# Patient Record
Sex: MALE | Race: WHITE | NOT HISPANIC OR LATINO | Employment: FULL TIME | URBAN - METROPOLITAN AREA
[De-identification: names, ages, dates, MRNs, and addresses within clinical notes are randomized per-mention and may not be internally consistent; named-entity substitution may affect disease eponyms.]

---

## 2021-09-17 ENCOUNTER — OFFICE VISIT (OUTPATIENT)
Dept: INTERNAL MEDICINE | Facility: CLINIC | Age: 21
End: 2021-09-17

## 2021-09-17 ENCOUNTER — LAB (OUTPATIENT)
Dept: LAB | Facility: HOSPITAL | Age: 21
End: 2021-09-17

## 2021-09-17 VITALS
SYSTOLIC BLOOD PRESSURE: 140 MMHG | WEIGHT: 189.2 LBS | HEIGHT: 72 IN | DIASTOLIC BLOOD PRESSURE: 94 MMHG | RESPIRATION RATE: 18 BRPM | TEMPERATURE: 97.7 F | OXYGEN SATURATION: 96 % | BODY MASS INDEX: 25.63 KG/M2 | HEART RATE: 80 BPM

## 2021-09-17 DIAGNOSIS — R41.840 POOR CONCENTRATION: ICD-10-CM

## 2021-09-17 DIAGNOSIS — Z83.3 FAMILY HISTORY OF DIABETES MELLITUS: ICD-10-CM

## 2021-09-17 DIAGNOSIS — F41.9 ANXIETY: Primary | ICD-10-CM

## 2021-09-17 DIAGNOSIS — F33.2 SEVERE EPISODE OF RECURRENT MAJOR DEPRESSIVE DISORDER, WITHOUT PSYCHOTIC FEATURES (HCC): ICD-10-CM

## 2021-09-17 LAB
25(OH)D3 SERPL-MCNC: 36.8 NG/ML
ALBUMIN SERPL-MCNC: 4.8 G/DL (ref 3.5–5.2)
ALBUMIN/GLOB SERPL: 1.7 G/DL
ALP SERPL-CCNC: 83 U/L (ref 39–117)
ALT SERPL W P-5'-P-CCNC: 31 U/L (ref 1–41)
ANION GAP SERPL CALCULATED.3IONS-SCNC: 11.4 MMOL/L (ref 5–15)
AST SERPL-CCNC: 23 U/L (ref 1–40)
BASOPHILS # BLD AUTO: 0.03 10*3/MM3 (ref 0–0.2)
BASOPHILS NFR BLD AUTO: 0.5 % (ref 0–1.5)
BILIRUB SERPL-MCNC: 0.5 MG/DL (ref 0–1.2)
BUN SERPL-MCNC: 13 MG/DL (ref 6–20)
BUN/CREAT SERPL: 14.8 (ref 7–25)
CALCIUM SPEC-SCNC: 9.6 MG/DL (ref 8.6–10.5)
CHLORIDE SERPL-SCNC: 98 MMOL/L (ref 98–107)
CO2 SERPL-SCNC: 29.6 MMOL/L (ref 22–29)
CREAT SERPL-MCNC: 0.88 MG/DL (ref 0.76–1.27)
DEPRECATED RDW RBC AUTO: 36.2 FL (ref 37–54)
EOSINOPHIL # BLD AUTO: 0.04 10*3/MM3 (ref 0–0.4)
EOSINOPHIL NFR BLD AUTO: 0.7 % (ref 0.3–6.2)
ERYTHROCYTE [DISTWIDTH] IN BLOOD BY AUTOMATED COUNT: 12.8 % (ref 12.3–15.4)
GFR SERPL CREATININE-BSD FRML MDRD: 107 ML/MIN/1.73
GLOBULIN UR ELPH-MCNC: 2.8 GM/DL
GLUCOSE SERPL-MCNC: 82 MG/DL (ref 65–99)
HBA1C MFR BLD: 5.2 % (ref 4.8–5.6)
HCT VFR BLD AUTO: 46.9 % (ref 37.5–51)
HGB BLD-MCNC: 15.7 G/DL (ref 13–17.7)
IMM GRANULOCYTES # BLD AUTO: 0.03 10*3/MM3 (ref 0–0.05)
IMM GRANULOCYTES NFR BLD AUTO: 0.5 % (ref 0–0.5)
LYMPHOCYTES # BLD AUTO: 2.01 10*3/MM3 (ref 0.7–3.1)
LYMPHOCYTES NFR BLD AUTO: 35.2 % (ref 19.6–45.3)
MCH RBC QN AUTO: 26.9 PG (ref 26.6–33)
MCHC RBC AUTO-ENTMCNC: 33.5 G/DL (ref 31.5–35.7)
MCV RBC AUTO: 80.3 FL (ref 79–97)
MONOCYTES # BLD AUTO: 0.67 10*3/MM3 (ref 0.1–0.9)
MONOCYTES NFR BLD AUTO: 11.7 % (ref 5–12)
NEUTROPHILS NFR BLD AUTO: 2.93 10*3/MM3 (ref 1.7–7)
NEUTROPHILS NFR BLD AUTO: 51.4 % (ref 42.7–76)
NRBC BLD AUTO-RTO: 0 /100 WBC (ref 0–0.2)
PLATELET # BLD AUTO: 241 10*3/MM3 (ref 140–450)
PMV BLD AUTO: 10.5 FL (ref 6–12)
POTASSIUM SERPL-SCNC: 4.5 MMOL/L (ref 3.5–5.2)
PROT SERPL-MCNC: 7.6 G/DL (ref 6–8.5)
RBC # BLD AUTO: 5.84 10*6/MM3 (ref 4.14–5.8)
SODIUM SERPL-SCNC: 139 MMOL/L (ref 136–145)
TSH SERPL DL<=0.05 MIU/L-ACNC: 1.54 UIU/ML (ref 0.27–4.2)
VIT B12 BLD-MCNC: 570 PG/ML (ref 211–946)
WBC # BLD AUTO: 5.71 10*3/MM3 (ref 3.4–10.8)

## 2021-09-17 PROCEDURE — 84443 ASSAY THYROID STIM HORMONE: CPT | Performed by: NURSE PRACTITIONER

## 2021-09-17 PROCEDURE — 80053 COMPREHEN METABOLIC PANEL: CPT | Performed by: NURSE PRACTITIONER

## 2021-09-17 PROCEDURE — 82607 VITAMIN B-12: CPT | Performed by: NURSE PRACTITIONER

## 2021-09-17 PROCEDURE — 85025 COMPLETE CBC W/AUTO DIFF WBC: CPT | Performed by: NURSE PRACTITIONER

## 2021-09-17 PROCEDURE — 83036 HEMOGLOBIN GLYCOSYLATED A1C: CPT | Performed by: NURSE PRACTITIONER

## 2021-09-17 PROCEDURE — 99203 OFFICE O/P NEW LOW 30 MIN: CPT | Performed by: NURSE PRACTITIONER

## 2021-09-17 PROCEDURE — 82306 VITAMIN D 25 HYDROXY: CPT | Performed by: NURSE PRACTITIONER

## 2021-09-17 RX ORDER — FLUOXETINE 10 MG/1
10 CAPSULE ORAL DAILY
Qty: 30 CAPSULE | Refills: 2 | Status: SHIPPED | OUTPATIENT
Start: 2021-09-17 | End: 2021-10-15

## 2021-09-17 NOTE — PROGRESS NOTES
Subjective   Gabriele Miguel is a 21 y.o. male    Chief Complaint   Patient presents with   • Establish Care   • ADHD     would like to have a referral   • Depression     discuss if this goes hand and hand with ADHD     History of Present Illness     New pt here to establish care    ADHD - was diagnosed at  approximately 2 years ago.  Was treated with Ritalin, but only took it for 1 month and stopped.      Anxiety REMI-7    Feeling nervous, anxious or on edge: Nearly every day  Not being able to stop or control worrying: Nearly every day  Worrying too much about different things: More than half the days  Trouble Relaxing: Nearly every day  Being so restless that it is hard to sit still: Nearly every day  Becoming easily annoyed or irritable: Nearly every day  Feeling afraid as if something awful might happen: More than half the days  REMI 7 Total Score: 19  If you checked any problems, how difficult have these problems made it for you to do your work, take care of things at home, or get along with other people: Very difficult       PHQ-9 Depression Screening  Little interest or pleasure in doing things? 3   Feeling down, depressed, or hopeless? 3   Trouble falling or staying asleep, or sleeping too much? 3   Feeling tired or having little energy? 3   Poor appetite or overeating? 3   Feeling bad about yourself - or that you are a failure or have let yourself or your family down? 2   Trouble concentrating on things, such as reading the newspaper or watching television? 3   Moving or speaking so slowly that other people could have noticed? Or the opposite - being so fidgety or restless that you have been moving around a lot more than usual? 2   Thoughts that you would be better off dead, or of hurting yourself in some way? 2   PHQ-9 Total Score 24   If you checked off any problems, how difficult have these problems made it for you to do your work, take care of things at home, or get along with other people? Very difficult      Past Medical History:   Diagnosis Date   • Asthma    • Depression      History reviewed. No pertinent surgical history.    Family History   Problem Relation Age of Onset   • Diabetes Mother    • Hypertension Father    • Thyroid disease Sister         [email or web address]   • No Known Problems Maternal Grandmother    • Diabetes type II Maternal Grandfather    • No Known Problems Paternal Grandfather    • ADD / ADHD Sister      Social History     Socioeconomic History   • Marital status: Single     Spouse name: Not on file   • Number of children: Not on file   • Years of education: Not on file   • Highest education level: Not on file   Tobacco Use   • Smoking status: Never Smoker   • Smokeless tobacco: Never Used   Vaping Use   • Vaping Use: Every day   Substance and Sexual Activity   • Alcohol use: Yes     Comment: socially   • Drug use: Never   • Sexual activity: Defer     Comment: engaged     No Known Allergies       The following portions of the patient's history were reviewed and updated as appropriate: allergies, current medications, past family history, past medical history, past social history, past surgical history and problem list.    Current Outpatient Medications:   •  albuterol (PROVENTIL HFA;VENTOLIN HFA) 108 (90 BASE) MCG/ACT inhaler, Inhale 2 puffs every 4 (four) hours as needed for wheezing., Disp: , Rfl:   •  FLUoxetine (PROzac) 10 MG capsule, Take 1 capsule by mouth Daily., Disp: 30 capsule, Rfl: 2     Review of Systems   Constitutional: Negative for chills, fatigue and fever.   Respiratory: Negative for cough, chest tightness and shortness of breath.    Cardiovascular: Negative for chest pain.   Gastrointestinal: Negative for abdominal pain, diarrhea, nausea and vomiting.   Endocrine: Negative for cold intolerance and heat intolerance.   Musculoskeletal: Negative for arthralgias.   Neurological: Negative for dizziness.   Psychiatric/Behavioral: Positive for decreased concentration, dysphoric mood and  "sleep disturbance. The patient is nervous/anxious.        Objective   Physical Exam  Constitutional:       Appearance: He is well-developed.   HENT:      Head: Normocephalic and atraumatic.   Eyes:      Conjunctiva/sclera: Conjunctivae normal.      Pupils: Pupils are equal, round, and reactive to light.   Cardiovascular:      Rate and Rhythm: Normal rate and regular rhythm.      Heart sounds: Normal heart sounds.   Pulmonary:      Effort: Pulmonary effort is normal.      Breath sounds: Normal breath sounds.   Abdominal:      General: Bowel sounds are normal.      Palpations: Abdomen is soft.   Musculoskeletal:         General: Normal range of motion.      Cervical back: Normal range of motion.   Skin:     General: Skin is warm and dry.   Neurological:      Mental Status: He is alert and oriented to person, place, and time.      Deep Tendon Reflexes: Reflexes are normal and symmetric.   Psychiatric:         Mood and Affect: Mood is anxious and depressed.         Behavior: Behavior normal.         Thought Content: Thought content normal.         Judgment: Judgment normal.       Vitals:    09/17/21 1025   BP: 140/94   Cuff Size: Large Adult   Pulse: 80   Resp: 18   Temp: 97.7 °F (36.5 °C)   TempSrc: Infrared   SpO2: 96%   Weight: 85.8 kg (189 lb 3.2 oz)   Height: 182.9 cm (72\")         Assessment/Plan   Diagnoses and all orders for this visit:    1. Anxiety (Primary)  -     FLUoxetine (PROzac) 10 MG capsule; Take 1 capsule by mouth Daily.  Dispense: 30 capsule; Refill: 2  -     CBC & Differential; Future  -     Comprehensive Metabolic Panel; Future  -     TSH Rfx On Abnormal To Free T4; Future  -     Vitamin B12; Future  -     Vitamin D 25 Hydroxy; Future  -     Ambulatory Referral to Psychiatry  -     CBC & Differential  -     Comprehensive Metabolic Panel  -     TSH Rfx On Abnormal To Free T4  -     Vitamin B12  -     Vitamin D 25 Hydroxy    2. Severe episode of recurrent major depressive disorder, without psychotic " features (CMS/HCC)  -     FLUoxetine (PROzac) 10 MG capsule; Take 1 capsule by mouth Daily.  Dispense: 30 capsule; Refill: 2  -     CBC & Differential; Future  -     Comprehensive Metabolic Panel; Future  -     TSH Rfx On Abnormal To Free T4; Future  -     Vitamin B12; Future  -     Vitamin D 25 Hydroxy; Future  -     Ambulatory Referral to Psychiatry  -     CBC & Differential  -     Comprehensive Metabolic Panel  -     TSH Rfx On Abnormal To Free T4  -     Vitamin B12  -     Vitamin D 25 Hydroxy    3. Poor concentration  -     Ambulatory Referral to Psychiatry    4. Family history of diabetes mellitus  -     Hemoglobin A1c; Future  -     Hemoglobin A1c    We will start prozac 10 mg daily  Referred to Psych  Labs sent  Return in about 4 weeks (around 10/15/2021) for f/u, collect labs today.

## 2021-10-15 ENCOUNTER — OFFICE VISIT (OUTPATIENT)
Dept: INTERNAL MEDICINE | Facility: CLINIC | Age: 21
End: 2021-10-15

## 2021-10-15 VITALS
WEIGHT: 193 LBS | SYSTOLIC BLOOD PRESSURE: 128 MMHG | TEMPERATURE: 97.4 F | HEART RATE: 81 BPM | HEIGHT: 72 IN | DIASTOLIC BLOOD PRESSURE: 80 MMHG | OXYGEN SATURATION: 97 % | RESPIRATION RATE: 16 BRPM | BODY MASS INDEX: 26.14 KG/M2

## 2021-10-15 DIAGNOSIS — F33.1 MODERATE EPISODE OF RECURRENT MAJOR DEPRESSIVE DISORDER (HCC): ICD-10-CM

## 2021-10-15 DIAGNOSIS — F41.9 ANXIETY: Primary | ICD-10-CM

## 2021-10-15 PROCEDURE — 99214 OFFICE O/P EST MOD 30 MIN: CPT | Performed by: NURSE PRACTITIONER

## 2021-10-15 RX ORDER — HYDROXYZINE HYDROCHLORIDE 10 MG/1
TABLET, FILM COATED ORAL
Qty: 90 TABLET | Refills: 1 | Status: SHIPPED | OUTPATIENT
Start: 2021-10-15

## 2021-10-15 RX ORDER — FLUOXETINE HYDROCHLORIDE 20 MG/1
20 CAPSULE ORAL DAILY
Qty: 30 CAPSULE | Refills: 2 | Status: SHIPPED | OUTPATIENT
Start: 2021-10-15 | End: 2021-10-28 | Stop reason: SINTOL

## 2021-10-15 NOTE — PROGRESS NOTES
Neo Miguel is a 21 y.o. male    Chief Complaint   Patient presents with   • Anxiety     f/u. started prozac, states his anxiety has not changed at all, anx is still really high is social situations   • Depression     feels like his depression is about 50% better. discuss medication options, if he needs to be increased add meds, etc     History of Present Illness     Pt is here for f/u on anxiety/depression    Pt was started on Prozac 10 mg at initial visit her on 9/17/2021.  States that anxiety has not really improved, but does feel that depression has decreased/improved.  Has been taking meds as directed.  Was referred to Psych and is scheduled to be seen on 10/28/2021.  He denies S/H ideations    The following portions of the patient's history were reviewed and updated as appropriate: allergies, current medications, past family history, past medical history, past social history, past surgical history and problem list.    Current Outpatient Medications:   •  albuterol (PROVENTIL HFA;VENTOLIN HFA) 108 (90 BASE) MCG/ACT inhaler, Inhale 2 puffs every 4 (four) hours as needed for wheezing., Disp: , Rfl:   •  FLUoxetine (PROzac) 20 MG capsule, Take 1 capsule by mouth Daily., Disp: 30 capsule, Rfl: 2  •  hydrOXYzine (ATARAX) 10 MG tablet, 1-2 PO TID PRN for anxiety, Disp: 90 tablet, Rfl: 1     Review of Systems   Constitutional: Negative for chills, fatigue and fever.   Respiratory: Negative for cough, chest tightness and shortness of breath.    Cardiovascular: Negative for chest pain.   Gastrointestinal: Negative for abdominal pain, diarrhea, nausea and vomiting.   Endocrine: Negative for cold intolerance and heat intolerance.   Musculoskeletal: Negative for arthralgias.   Neurological: Negative for dizziness.   Psychiatric/Behavioral: Positive for decreased concentration and dysphoric mood. The patient is nervous/anxious.        Objective   Physical Exam  Constitutional:       Appearance: He is  "well-developed.   HENT:      Head: Normocephalic and atraumatic.   Eyes:      Conjunctiva/sclera: Conjunctivae normal.      Pupils: Pupils are equal, round, and reactive to light.   Cardiovascular:      Rate and Rhythm: Normal rate and regular rhythm.      Heart sounds: Normal heart sounds.   Pulmonary:      Effort: Pulmonary effort is normal.      Breath sounds: Normal breath sounds.   Abdominal:      General: Bowel sounds are normal.      Palpations: Abdomen is soft.   Musculoskeletal:         General: Normal range of motion.      Cervical back: Normal range of motion.   Skin:     General: Skin is warm and dry.   Neurological:      Mental Status: He is alert and oriented to person, place, and time.      Deep Tendon Reflexes: Reflexes are normal and symmetric.   Psychiatric:         Mood and Affect: Mood is anxious and depressed.         Behavior: Behavior normal.         Thought Content: Thought content normal.         Judgment: Judgment normal.       Vitals:    10/15/21 1110   BP: 128/80   BP Location: Left arm   Cuff Size: Adult   Pulse: 81   Resp: 16   Temp: 97.4 °F (36.3 °C)   TempSrc: Infrared   SpO2: 97%   Weight: 87.5 kg (193 lb)   Height: 182.9 cm (72\")         Assessment/Plan   Diagnoses and all orders for this visit:    1. Anxiety (Primary)  -     FLUoxetine (PROzac) 20 MG capsule; Take 1 capsule by mouth Daily.  Dispense: 30 capsule; Refill: 2  -     hydrOXYzine (ATARAX) 10 MG tablet; 1-2 PO TID PRN for anxiety  Dispense: 90 tablet; Refill: 1    2. Moderate episode of recurrent major depressive disorder (HCC)  -     FLUoxetine (PROzac) 20 MG capsule; Take 1 capsule by mouth Daily.  Dispense: 30 capsule; Refill: 2      We will increase the Prozac to 20 mg daily  Hydroxyzine added for PRN use  Keep scheduled appt with behavioral health as scheduled  Return in about 4 weeks (around 11/12/2021).             "

## 2021-10-28 ENCOUNTER — TELEMEDICINE (OUTPATIENT)
Dept: PSYCHIATRY | Facility: CLINIC | Age: 21
End: 2021-10-28

## 2021-10-28 DIAGNOSIS — F33.0 MAJOR DEPRESSIVE DISORDER, RECURRENT EPISODE, MILD WITH ANXIOUS DISTRESS (HCC): Chronic | ICD-10-CM

## 2021-10-28 DIAGNOSIS — F90.2 ATTENTION DEFICIT HYPERACTIVITY DISORDER, COMBINED TYPE: Primary | Chronic | ICD-10-CM

## 2021-10-28 PROCEDURE — 90792 PSYCH DIAG EVAL W/MED SRVCS: CPT | Performed by: NURSE PRACTITIONER

## 2021-10-28 RX ORDER — BUPROPION HYDROCHLORIDE 150 MG/1
TABLET ORAL
Qty: 30 TABLET | Refills: 0 | Status: SHIPPED | OUTPATIENT
Start: 2021-10-28 | End: 2021-11-23 | Stop reason: SDUPTHER

## 2021-10-28 NOTE — PROGRESS NOTES
"This provider is located at the [facility] (through [facility]), [address], [place], [address] using a secure [employer] Video Visit through [employer]. Patient is being seen remotely via telehealth at their home address in [place], and stated they are in a secure environment for this session. The patient's condition being diagnosed/treated is appropriate for telemedicine. The provider identified herself as well as her credentials.   The patient, and/or patients guardian, consent to be seen remotely, and when consent is given they understand that the consent allows for patient identifiable information to be sent to a third party as needed.   They may refuse to be seen remotely at any time. The electronic data is encrypted and password protected, and the patient and/or guardian has been advised of the potential risks to privacy not withstanding such measures.    You have chosen to receive care through a telehealth visit.  Do you consent to use a video/audio connection for your medical care today? Yes        Subjective   Gabriele Miguel is a 21 y.o. male who presents today for initial evaluation     Chief Complaint:  ADHD and depression    Accompanied by:Pt was alone for duration of appointment    History of Present Illness:   \"I made this appointment to get ADHD medication.\" Per pt, he was first diagnosed with ADHD in 2019 by a psychiatrist at [facility]. He was prescribed Adderall 5-10 mg PO BID. Pt states that it made him feel \"run down\" so he stopped taking it. Pt reports that college became too much of a challenge and he made the decision to quit. Pt has been at his current job for a year and will be in training for another year. 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 Pt reports that the  he works with pokes fun at him frequently for forgetting and making simple mistakes. Pt states the job is very tedious " Faxed referring physician Dr. Jose Orellana sleep study results and clinical notes and there is a lot to remember. Pt has symptoms at work and home, but he is most concerned with his ability to focus and concentrate at work. Pt is determined to be successful. The patient endorses symptoms of ADHD including, but not limited to: careless mistakes or not paying attention to directions or people of authority, trouble keeping attention on tasks and during hobbies or leisure activities, does not listen when spoken to directly, does not follow instructions and fails to finish homework chores daily tasks or duties at work, trouble organizing activities, avoids dislikes or doesn't want to do things that require mental effort for a long period of time, loses things needed for tasks, easily distracted, forgetful in daily activities, often fidgets with hands or feet or squirms in seat, often is restless, often gets up from seat and moves around when remaining in seat is expected, often blurts out answers before questions have been finished, often has trouble waiting one's turn and often interrupts or intrudes on others which have caused impairment in important areas of daily functioning. The patient/guardian rates their ADHD at an 8/10 on a 0-10 scale, with 10 being the worst. Pt reports experiencing depression intermittently through the years. Prozac has helped with the depression but not anxiety. The 20 mg of Prozac has also caused the side effect of impotence. The episode of depression started months ago, but worsened after his fiance decided to go back to college. Pt states there isn't always a precipitant. The patient endorses significant symptoms of depression including: reduced interests in activities, changes in energy level, difficulty with concentration, change in appetite, psychomotor changes and decrease in social activity which have caused impairment in important areas of daily functioning. The patient has had symptoms of depression for at least six months , which have worsened over the last few  "months. The patient rates their depression at a 2/10 on a 0-10 scale, with 10 being the worst. Pt is also experiencing anxiety, which he feels if fairly new to him. The patient endorses significant symptoms of anxiety including: sweating, hot flashes, accelerated heart rate, restlessness or feeling keyed up, difficulty concentrating or mind going blank, irritability and muscle tension which have caused impairment in important areas of daily functioning. The patient has had symptoms of anxiety since he started his current job, which have worsened over the last few months when his fiance decided to go back to college. The patient rates their anxiety at a 10/10 on a 0-10 scale, with 10 being the worst.    Current Psychiatric Medications:  Prozac 20 mg PO Daily (increased a week or two ago)  Hydroxyzine 10-20 mg PO TID PRN    Prior Psychiatric Medications:  Prozac - 20 mg is causing impotence  Hydroxyzine -  It mildly helps  Adderall - tried 5-10 mg BID and it made him feel \"run down\".     Currently in Counseling or Therapy:  Denies    Prior Psychiatric Outpatient Care:  Pt was seeing a psychiatrist at [facility]. They diagnosed him in 2019 and he was treated with Adderall.     Prior Psychiatric Hospitalizations:  Denies    Previous Suicide Attempts:  Denies    Previous Self-Harming Behavior:  Pt used to cut when he was an adolescent    Any family history of suicide attempts:  Denies    Legal History, Arrests, or Incarcerations:  Never arrested    Violent Tendencies:  When angry, he used to throw things, hit things, yell.     Developmental History:  The patient reports they were the result of a full-term pregnancy.  The patient reports they met all of their developmental milestones as expected.  The patient denies knowledge of the biological mother using alcohol or illicit/recreational substances during the pregnancy with the patient.    History of Seizures or TBI:  Concussion  Denies seizures    Highest Level of " Education:  Some college    Employment:  Works at full-time; pt likes it. He finds it challenging though with the ADHD  He is having trouble with the wiring and remembering that is involved.   [employer]; pt builds elevators     History:  Denies    Social History:  Born: [place]   Marriage status: Never . Pt has been with his fiance for five years. It is a good relationship  Children: None  Lives with: The patient's currently household consists of the patient  Any particular staci or Yarsanism the patient believes/follows: Denies    Abuse History:  Verbal: Denies  Emotional: Denies  Mental: Denies  Physical: Denies  Sexual: Denies  Other: Denies    Pt's father was a substance abuser and when he would go to his house at a young age, he would often leave pt alone to care for himself.     Patient's Support Network Includes:  sisters and fiance          The following portions of the patient's history were reviewed and updated as appropriate: allergies, current medications, past family history, past medical history, past social history, past surgical history and problem list.          Past Medical History:  Past Medical History:   Diagnosis Date   • ADHD (attention deficit hyperactivity disorder)    • Anxiety    • Asthma    • Depression        Social History:  Social History     Socioeconomic History   • Marital status: Single   Tobacco Use   • Smoking status: Never Smoker   • Smokeless tobacco: Never Used   Vaping Use   • Vaping Use: Every day   Substance and Sexual Activity   • Alcohol use: Yes     Comment: socially   • Drug use: Yes     Types: Marijuana     Comment: Rarely, once in the 6 months   • Sexual activity: Yes     Partners: Female     Comment: engaged       Family History:  Family History   Problem Relation Age of Onset   • Diabetes Mother    • Hypertension Father    • Drug abuse Father    • Thyroid disease Sister         hashimotos   • No Known Problems Maternal Grandmother    • Diabetes type  II Maternal Grandfather    • No Known Problems Paternal Grandfather    • ADD / ADHD Sister        Past Surgical History:  History reviewed. No pertinent surgical history.    Problem List:  Patient Active Problem List   Diagnosis   (none) - all problems resolved or deleted       Allergy:   No Known Allergies     Current Medications:   Current Outpatient Medications   Medication Sig Dispense Refill   • albuterol (PROVENTIL HFA;VENTOLIN HFA) 108 (90 BASE) MCG/ACT inhaler Inhale 2 puffs every 4 (four) hours as needed for wheezing.     • hydrOXYzine (ATARAX) 10 MG tablet 1-2 PO TID PRN for anxiety 90 tablet 1   • buPROPion XL (Wellbutrin XL) 150 MG 24 hr tablet Take 1 tablet PO QAM 30 tablet 0     No current facility-administered medications for this visit.       Review of Symptoms:    Review of Systems   Constitutional: Positive for activity change, appetite change and fatigue.   Psychiatric/Behavioral: Positive for decreased concentration, depressed mood and stress. The patient is nervous/anxious.          Physical Exam:   Due to the remote nature of this encounter (virtual encounter), vitals were unable to be obtained.  Height stated at 72 inches.  Weight stated at 193 pounds.      Physical Exam  Neurological:      Mental Status: He is alert.   Psychiatric:         Attention and Perception: Perception normal. He is inattentive. He does not perceive auditory or visual hallucinations.         Mood and Affect: Mood and affect normal.         Speech: Speech normal.         Behavior: Behavior normal. Behavior is cooperative.         Thought Content: Thought content normal. Thought content is not paranoid or delusional. Thought content does not include homicidal or suicidal ideation. Thought content does not include homicidal or suicidal plan.         Cognition and Memory: Cognition and memory normal.      Comments: Very easily distracted. Fidgety and restless.            Mental Status Exam:   Hygiene:   good  Cooperation:   Cooperative  Eye Contact:  Fair; pt was easliy distracted  Psychomotor Behavior:  Restless  Affect:  Appropriate  Mood: normal  Speech:  Normal  Thought Process:  Goal directed  Thought Content:  Normal  Suicidal:  None  Homicidal:  None  Hallucinations:  None  Delusion:  None  Memory:  Intact  Orientation:  Person, Place, Time and Situation  Reliability:  good  Insight:  Fair  Judgement:  Fair  Impulse Control:  Fair  Physical/Medical Issues:  No        PHQ-9 Depression Screening  Little interest or pleasure in doing things? 3   Feeling down, depressed, or hopeless? 1   Trouble falling or staying asleep, or sleeping too much? 3   Feeling tired or having little energy? 2   Poor appetite or overeating? 1   Feeling bad about yourself - or that you are a failure or have let yourself or your family down? 3   Trouble concentrating on things, such as reading the newspaper or watching television? 3   Moving or speaking so slowly that other people could have noticed? Or the opposite - being so fidgety or restless that you have been moving around a lot more than usual? 3   Thoughts that you would be better off dead, or of hurting yourself in some way? 0   PHQ-9 Total Score 19   If you checked off any problems, how difficult have these problems made it for you to do your work, take care of things at home, or get along with other people? Extremely dIfficult     PHQ-9 Total Score: 19        REMI 7 anxiety screening tool that patient filled out virtually reviewed by this APRN at today's encounter.    PROMIS scale screening tool that patient filled out virtually reviewed by this APRN at today's encounter.    [facility] request number [id number] reviewed by this APRN at today's encounter.    Previous Provider notes and available records reviewed by this APRN at today's encounter.     Patient screened positive for depression based on a PHQ-9 score of 19 on 10/28/2021. Follow-up recommendations include: Prescribed antidepressant  medication treatment.      Lab Results:   No visits with results within 1 Month(s) from this visit.   Latest known visit with results is:   Office Visit on 09/17/2021   Component Date Value Ref Range Status   • Glucose 09/17/2021 82  65 - 99 mg/dL Final   • BUN 09/17/2021 13  6 - 20 mg/dL Final   • Creatinine 09/17/2021 0.88  0.76 - 1.27 mg/dL Final   • Sodium 09/17/2021 139  136 - 145 mmol/L Final   • Potassium 09/17/2021 4.5  3.5 - 5.2 mmol/L Final   • Chloride 09/17/2021 98  98 - 107 mmol/L Final   • CO2 09/17/2021 29.6* 22.0 - 29.0 mmol/L Final   • Calcium 09/17/2021 9.6  8.6 - 10.5 mg/dL Final   • Total Protein 09/17/2021 7.6  6.0 - 8.5 g/dL Final   • Albumin 09/17/2021 4.80  3.50 - 5.20 g/dL Final   • ALT (SGPT) 09/17/2021 31  1 - 41 U/L Final   • AST (SGOT) 09/17/2021 23  1 - 40 U/L Final   • Alkaline Phosphatase 09/17/2021 83  39 - 117 U/L Final   • Total Bilirubin 09/17/2021 0.5  0.0 - 1.2 mg/dL Final   • eGFR Non  Amer 09/17/2021 107  >60 mL/min/1.73 Final   • Globulin 09/17/2021 2.8  gm/dL Final   • A/G Ratio 09/17/2021 1.7  g/dL Final   • BUN/Creatinine Ratio 09/17/2021 14.8  7.0 - 25.0 Final   • Anion Gap 09/17/2021 11.4  5.0 - 15.0 mmol/L Final   • TSH 09/17/2021 1.540  0.270 - 4.200 uIU/mL Final   • Vitamin B-12 09/17/2021 570  211 - 946 pg/mL Final   • 25 Hydroxy, Vitamin D 09/17/2021 36.8  ng/ml Final   • Hemoglobin A1C 09/17/2021 5.20  4.80 - 5.60 % Final   • WBC 09/17/2021 5.71  3.40 - 10.80 10*3/mm3 Final   • RBC 09/17/2021 5.84* 4.14 - 5.80 10*6/mm3 Final   • Hemoglobin 09/17/2021 15.7  13.0 - 17.7 g/dL Final   • Hematocrit 09/17/2021 46.9  37.5 - 51.0 % Final   • MCV 09/17/2021 80.3  79.0 - 97.0 fL Final   • MCH 09/17/2021 26.9  26.6 - 33.0 pg Final   • MCHC 09/17/2021 33.5  31.5 - 35.7 g/dL Final   • RDW 09/17/2021 12.8  12.3 - 15.4 % Final   • RDW-SD 09/17/2021 36.2* 37.0 - 54.0 fl Final   • MPV 09/17/2021 10.5  6.0 - 12.0 fL Final   • Platelets 09/17/2021 241  140 - 450 10*3/mm3 Final    • Neutrophil % 09/17/2021 51.4  42.7 - 76.0 % Final   • Lymphocyte % 09/17/2021 35.2  19.6 - 45.3 % Final   • Monocyte % 09/17/2021 11.7  5.0 - 12.0 % Final   • Eosinophil % 09/17/2021 0.7  0.3 - 6.2 % Final   • Basophil % 09/17/2021 0.5  0.0 - 1.5 % Final   • Immature Grans % 09/17/2021 0.5  0.0 - 0.5 % Final   • Neutrophils, Absolute 09/17/2021 2.93  1.70 - 7.00 10*3/mm3 Final   • Lymphocytes, Absolute 09/17/2021 2.01  0.70 - 3.10 10*3/mm3 Final   • Monocytes, Absolute 09/17/2021 0.67  0.10 - 0.90 10*3/mm3 Final   • Eosinophils, Absolute 09/17/2021 0.04  0.00 - 0.40 10*3/mm3 Final   • Basophils, Absolute 09/17/2021 0.03  0.00 - 0.20 10*3/mm3 Final   • Immature Grans, Absolute 09/17/2021 0.03  0.00 - 0.05 10*3/mm3 Final   • nRBC 09/17/2021 0.0  0.0 - 0.2 /100 WBC Final         Assessment/Plan   Problems Addressed this Visit     None      Visit Diagnoses     Attention deficit hyperactivity disorder, combined type  (Chronic)   -  Primary    Relevant Medications    buPROPion XL (Wellbutrin XL) 150 MG 24 hr tablet    Other Relevant Orders    Urine Drug Screen - Urine, Clean Catch    Major depressive disorder, recurrent episode, mild with anxious distress (HCC)  (Chronic)       Relevant Medications    buPROPion XL (Wellbutrin XL) 150 MG 24 hr tablet      Diagnoses       Codes Comments    Attention deficit hyperactivity disorder, combined type    -  Primary ICD-10-CM: F90.2  ICD-9-CM: 314.01     Major depressive disorder, recurrent episode, mild with anxious distress (HCC)     ICD-10-CM: F33.0  ICD-9-CM: 296.31           Visit Diagnoses:    ICD-10-CM ICD-9-CM   1. Attention deficit hyperactivity disorder, combined type  F90.2 314.01   2. Major depressive disorder, recurrent episode, mild with anxious distress (HCC)  F33.0 296.31          GOALS:  Short Term Goals: Patient will be compliant with medication, and patient will have no significant medication related side effects.  Patient will be engaged in psychotherapy as  indicated.  Patient will report subjective improvement of symptoms.  Long term goals: To stabilize mood and treat/improve subjective symptoms, the patient will stay out of the hospital, the patient will be at an optimal level of functioning, and the patient will take all medications as prescribed.  The patient verbalized understanding and agreement with goals that were mutually set.      TREATMENT PLAN:   Continue supportive psychotherapy efforts and medications as indicated.   -Continue hydroxyzine 10-20 mg PO TID PRN for anxiety from PCP. This APRN will take over prescription when pt needs a refill  -Discontinue Prozac due to side effect of impotence  -Start Wellbutrin  mg PO QAM for depression  -Obtain UDS. Pt has an appointment with PCP next week. Pt plans to get UDS completed while there. Pt smokes marijuana on occasion, last time being 3 weeks ago. Explained to pt that he has to have a negative UDS. Pt is willing to give up marijuana  -Will discuss ADHD treatment next appointment and after UDS results are in.     Medication and treatment options, both pharmacological and non-pharmacological treatment options, discussed during today's visit, including any off label use of medication. Patient acknowledged and verbally consented with current treatment plan and was educated on the importance of compliance with treatment and follow-up appointments.        MEDICATION ISSUES:    Discussed treatment plan and medication options of prescribed medication as well as the risks, benefits, any black box warnings, and side effects including potential falls, possible impaired driving, and metabolic adversities among others, including any off label use of medication. Patient is agreeable to call the office with any worsening of symptoms or onset of side effects, or if any concerns or questions arise.  The contact information for the office is made available to the patient. Patient is agreeable to call 911 or go to the  nearest ER should they begin having any SI/HI, or if any urgent concerns arise. No medication side effects or related complaints today.       MEDS ORDERED DURING VISIT:  New Medications Ordered This Visit   Medications   • buPROPion XL (Wellbutrin XL) 150 MG 24 hr tablet     Sig: Take 1 tablet PO QAM     Dispense:  30 tablet     Refill:  0       Return in about 4 weeks (around 11/25/2021), or if symptoms worsen or fail to improve, for Recheck.     Treatment plan completed: 10/28/21    Progress toward goal: Not at goal    Functional Status: Moderate impairment     Prognosis: Good with Ongoing Treatment         This document has been electronically signed by MITCHEL Billings  October 28, 2021 18:47 EDT    Please note that portions of this note were completed with a voice recognition program. Efforts were made to edit dictation, but occasionally words are mistranscribed.

## 2021-10-28 NOTE — TREATMENT PLAN
Multi-Disciplinary Problems (from Behavioral Health Treatment Plan)    Active Problems     Problem: Depression  Start Date: 10/28/21    Problem Details: The patient self-scales this problem as a 5 with 10 being the worst.        Goal Priority Start Date Expected End Date End Date    Patient will demonstrate the ability to initiate new constructive life skills outside of sessions on a consistent basis. -- 10/28/21 -- --    Goal Details: Progress toward goal:  Not appropriate to rate progress toward goal since this is the initial treatment plan.        Goal Intervention Frequency Start Date End Date    Assist patient in setting attainable activities of daily living goals. PRN 10/28/21 --    Goal Intervention Frequency Start Date End Date    Provide education about depression Q Month 10/28/21 --    Intervention Details: Duration of treatment until until remission of symptoms.        Goal Intervention Frequency Start Date End Date    Assist patient in developing healthy coping strategies. Q Month 10/28/21 --    Intervention Details: Duration of treatment until until remission of symptoms.              Problem: ADHD (Adult)  Start Date: 10/28/21    Problem Details: The patient self-scales this problem as a 8 with 10 being the worst.      Goal Priority Start Date Expected End Date End Date    Patient will sustain attention and concentration to complete chores, and work responsibilites and increase positive interaction in all relationships. -- 10/28/21 -- --    Goal Details: Progress toward goal:  Not appropriate to rate progress toward goal since this is the initial treatment plan.      Goal Intervention Frequency Start Date End Date    Assist patient in setting responsible goals and breaking down large tasks. Q Month 10/28/21 --    Intervention Details: Duration of treatment until until remission of symptoms.      Goal Intervention Frequency Start Date End Date    Assist patient in using self monitoring checklist to  improve attention, work performance, and social skills. Q Month 10/28/21 --    Intervention Details: Duration of treatment until until remission of symptoms.                         I have discussed and reviewed this treatment plan with the patient and/or guardian.  The patient has verbally agreed with this treatment plan (no signatures are obtained at today's visit as the patient is a telehealth patient and is unable to print and sign this document, therefore verbal agreement is obtained).  .

## 2021-11-11 ENCOUNTER — OFFICE VISIT (OUTPATIENT)
Dept: INTERNAL MEDICINE | Facility: CLINIC | Age: 21
End: 2021-11-11

## 2021-11-11 VITALS
OXYGEN SATURATION: 99 % | TEMPERATURE: 97.3 F | SYSTOLIC BLOOD PRESSURE: 124 MMHG | WEIGHT: 195 LBS | HEART RATE: 89 BPM | BODY MASS INDEX: 26.41 KG/M2 | DIASTOLIC BLOOD PRESSURE: 78 MMHG | HEIGHT: 72 IN | RESPIRATION RATE: 16 BRPM

## 2021-11-11 DIAGNOSIS — F33.1 MODERATE EPISODE OF RECURRENT MAJOR DEPRESSIVE DISORDER (HCC): ICD-10-CM

## 2021-11-11 DIAGNOSIS — Z79.899 ENCOUNTER FOR LONG-TERM (CURRENT) USE OF OTHER MEDICATIONS: ICD-10-CM

## 2021-11-11 DIAGNOSIS — F41.9 ANXIETY: Primary | ICD-10-CM

## 2021-11-11 PROCEDURE — 99214 OFFICE O/P EST MOD 30 MIN: CPT | Performed by: NURSE PRACTITIONER

## 2021-11-11 NOTE — PROGRESS NOTES
Neo Miguel is a 21 y.o. male    Chief Complaint   Patient presents with   • Anxiety     f/u, had seen behavioral health on 10/28/21, switched from prozac to bupropion due to side effects.   • Depression     History of Present Illness     Here f/u on anxiety and depression    Was started on prozac 10 and increased to 20 mg daily.  Did not feel well.  Was referred to Behavorial [facility] (Karyn Gregory).  Prozac was d/c'd and he was started on wellbutrin.  He is feeling much better!    She will also be treating him for ADHD and has requested a UDS.      The following portions of the patient's history were reviewed and updated as appropriate: allergies, current medications, past family history, past medical history, past social history, past surgical history and problem list.    Current Outpatient Medications:   •  albuterol (PROVENTIL HFA;VENTOLIN HFA) 108 (90 BASE) MCG/ACT inhaler, Inhale 2 puffs every 4 (four) hours as needed for wheezing., Disp: , Rfl:   •  buPROPion XL (Wellbutrin XL) 150 MG 24 hr tablet, Take 1 tablet PO QAM, Disp: 30 tablet, Rfl: 0  •  hydrOXYzine (ATARAX) 10 MG tablet, 1-2 PO TID PRN for anxiety, Disp: 90 tablet, Rfl: 1     Review of Systems   Constitutional: Negative for chills, fatigue and fever.   Respiratory: Negative for cough, chest tightness and shortness of breath.    Cardiovascular: Negative for chest pain.   Gastrointestinal: Negative for abdominal pain, diarrhea, nausea and vomiting.   Endocrine: Negative for cold intolerance and heat intolerance.   Musculoskeletal: Negative for arthralgias.   Neurological: Negative for dizziness.       Objective   Physical Exam  Constitutional:       Appearance: He is well-developed.   HENT:      Head: Normocephalic and atraumatic.   Eyes:      Conjunctiva/sclera: Conjunctivae normal.      Pupils: Pupils are equal, round, and reactive to light.   Cardiovascular:      Rate and Rhythm: Normal rate and regular rhythm.      Heart sounds: Normal  "heart sounds.   Pulmonary:      Effort: Pulmonary effort is normal.      Breath sounds: Normal breath sounds.   Abdominal:      General: Bowel sounds are normal.      Palpations: Abdomen is soft.   Musculoskeletal:         General: Normal range of motion.      Cervical back: Normal range of motion.   Skin:     General: Skin is warm and dry.   Neurological:      Mental Status: He is alert and oriented to person, place, and time.      Deep Tendon Reflexes: Reflexes are normal and symmetric.   Psychiatric:         Behavior: Behavior normal.         Thought Content: Thought content normal.         Judgment: Judgment normal.       Vitals:    11/11/21 1340   BP: 124/78   Cuff Size: Adult   Pulse: 89   Resp: 16   Temp: 97.3 °F (36.3 °C)   TempSrc: Infrared   SpO2: 99%   Weight: 88.5 kg (195 lb)   Height: 182.9 cm (72\")         Assessment/Plan   Diagnoses and all orders for this visit:    1. Anxiety (Primary)  -     Compliance Drug Analysis, Ur - Urine, Clean Catch; Future    2. Moderate episode of recurrent major depressive disorder (HCC)  -     Compliance Drug Analysis, Ur - Urine, Clean Catch; Future    3. Encounter for long-term (current) use of other medications  -     Compliance Drug Analysis, Ur - Urine, Clean Catch; Future      UDS sent  Will send copy to KIM Billings signed  Return in about 3 months (around 2/11/2022) for Annual.             "

## 2021-11-23 ENCOUNTER — TELEMEDICINE (OUTPATIENT)
Dept: PSYCHIATRY | Facility: CLINIC | Age: 21
End: 2021-11-23

## 2021-11-23 ENCOUNTER — TELEPHONE (OUTPATIENT)
Dept: INTERNAL MEDICINE | Facility: CLINIC | Age: 21
End: 2021-11-23

## 2021-11-23 DIAGNOSIS — F90.2 ATTENTION DEFICIT HYPERACTIVITY DISORDER, COMBINED TYPE: Primary | Chronic | ICD-10-CM

## 2021-11-23 DIAGNOSIS — F33.0 MAJOR DEPRESSIVE DISORDER, RECURRENT EPISODE, MILD WITH ANXIOUS DISTRESS (HCC): Chronic | ICD-10-CM

## 2021-11-23 PROCEDURE — 99214 OFFICE O/P EST MOD 30 MIN: CPT | Performed by: NURSE PRACTITIONER

## 2021-11-23 RX ORDER — BUPROPION HYDROCHLORIDE 150 MG/1
TABLET ORAL
Qty: 30 TABLET | Refills: 0 | Status: SHIPPED | OUTPATIENT
Start: 2021-11-23 | End: 2021-12-23 | Stop reason: ALTCHOICE

## 2021-11-23 NOTE — PROGRESS NOTES
"This provider is located at the [facility] ([facility] [address], [place], [address] using a secure [employer] Video Visit through [email or web address]. Patient is being seen remotely via telehealth at their home address in [place], and stated they are in a secure environment for this session. The patient's condition being diagnosed/treated is appropriate for telemedicine. The provider identified herself as well as her credentials.   The patient, and/or patients guardian, consent to be seen remotely, and when consent is given they understand that the consent allows for patient identifiable information to be sent to a third party as needed.   They may refuse to be seen remotely at any time. The electronic data is encrypted and password protected, and the patient and/or guardian has been advised of the potential risks to privacy not withstanding such measures.    You have chosen to receive care through a telehealth visit.  Do you consent to use a video/audio connection for your medical care today? Yes        Neo Miguel is a 21 y.o. male who presents today for follow up    Chief Complaint:  ADHD and depression    Accompanied by:Pt was alone for duration of appointment    History of Present Illness:   Pt states that the Wellbutrin is working out better than the Prozac. Pt denies sexual side effects. Pt experienced headaches when he initially took it, but they have since resolved. Pt has noticed mild irritability, but pt states he works with an \"ass\", which could be the source of the problem. He continues to have problems with focus and concentration. Pt completed his UDS at PCP office. This APRN will contact her to obtain results. Pt forgot to take the Wellbutrin XL the past few days and is having vivid dreams and has been restless. Otherwise, he his sleep has been fairly stable. Pt doesn't find it problematic at this time. Appetite is stable. Most of pt's anxiety " "is social anxiety. The patient denies any new medical problems or changes in medications since last appointment with this facility. The patient reports compliance with current medication regimen. The patient denies any current side effects from her current medication regimen. The patient denies any abnormal muscle movements or tics. The patient rates their depression at a 1-2/10 on a 0-10 scale, with 10 being the worst. The patient rates their anxiety at a 4-5/10 on a 0-10 scale, with 10 being the worst. The patient denies any suicidal or homicidal ideations, plans, or intent at today's encounter and is convincing.  The patient denies any auditory hallucinations or visual hallucinations. The patient does not endorse any significant symptoms consistent with eyad or psychosis at today's encounter.          Prior Psychiatric Medications:  Prozac - 20 mg is causing impotence  Hydroxyzine -  It mildly helps  Adderall - tried 5-10 mg BID and it made him feel \"run down\".           The following portions of the patient's history were reviewed and updated as appropriate: allergies, current medications, past family history, past medical history, past social history, past surgical history and problem list.          Past Medical History:  Past Medical History:   Diagnosis Date   • ADHD (attention deficit hyperactivity disorder)    • Anxiety    • Asthma    • Depression        Social History:  Social History     Socioeconomic History   • Marital status: Single   Tobacco Use   • Smoking status: Never Smoker   • Smokeless tobacco: Never Used   Vaping Use   • Vaping Use: Every day   Substance and Sexual Activity   • Alcohol use: Yes     Comment: socially   • Drug use: Yes     Types: Marijuana     Comment: Rarely, once in the 6 months   • Sexual activity: Yes     Partners: Female     Comment: engaged       Family History:  Family History   Problem Relation Age of Onset   • Diabetes Mother    • Hypertension Father    • Drug abuse " Father    • Thyroid disease Sister         hashimotos   • No Known Problems Maternal Grandmother    • Diabetes type II Maternal Grandfather    • No Known Problems Paternal Grandfather    • ADD / ADHD Sister        Past Surgical History:  History reviewed. No pertinent surgical history.    Problem List:  Patient Active Problem List   Diagnosis   (none) - all problems resolved or deleted       Allergy:   No Known Allergies     Current Medications:   Current Outpatient Medications   Medication Sig Dispense Refill   • albuterol (PROVENTIL HFA;VENTOLIN HFA) 108 (90 BASE) MCG/ACT inhaler Inhale 2 puffs every 4 (four) hours as needed for wheezing.     • buPROPion XL (Wellbutrin XL) 150 MG 24 hr tablet Take 1 tablet PO QAM 30 tablet 0   • hydrOXYzine (ATARAX) 10 MG tablet 1-2 PO TID PRN for anxiety 90 tablet 1     No current facility-administered medications for this visit.       Review of Symptoms:    Review of Systems   Constitutional: Positive for appetite change and fatigue.   Psychiatric/Behavioral: Positive for decreased concentration. The patient is nervous/anxious.          Physical Exam:   Due to the remote nature of this encounter (virtual encounter), vitals were unable to be obtained.  Height stated at 72 inches.  Weight stated at 195 pounds.      Physical Exam  Neurological:      Mental Status: He is alert.   Psychiatric:         Attention and Perception: Attention and perception normal. He does not perceive auditory or visual hallucinations.         Mood and Affect: Mood and affect normal.         Speech: Speech normal.         Behavior: Behavior normal. Behavior is cooperative.         Thought Content: Thought content normal. Thought content is not paranoid or delusional. Thought content does not include homicidal or suicidal ideation. Thought content does not include homicidal or suicidal plan.         Cognition and Memory: Cognition and memory normal.      Comments:             Mental Status Exam:   Hygiene:    good  Cooperation:  Cooperative  Eye Contact:  Good  Psychomotor Behavior:  Appropriate  Affect:  Appropriate  Mood: normal  Speech:  Normal  Thought Process:  Linear  Thought Content:  Normal  Suicidal:  None  Homicidal:  None  Hallucinations:  None  Delusion:  None  Memory:  Intact  Orientation:  Person, Place, Time and Situation  Reliability:  good  Insight:  Fair  Judgement:  Fair  Impulse Control:  Fair  Physical/Medical Issues:  No        PHQ-9 Depression Screening  Little interest or pleasure in doing things? 1   Feeling down, depressed, or hopeless? 0   Trouble falling or staying asleep, or sleeping too much? 1   Feeling tired or having little energy? 1   Poor appetite or overeating? 1   Feeling bad about yourself - or that you are a failure or have let yourself or your family down? 0   Trouble concentrating on things, such as reading the newspaper or watching television? 3   Moving or speaking so slowly that other people could have noticed? Or the opposite - being so fidgety or restless that you have been moving around a lot more than usual? 3   Thoughts that you would be better off dead, or of hurting yourself in some way? 0   PHQ-9 Total Score 10   If you checked off any problems, how difficult have these problems made it for you to do your work, take care of things at home, or get along with other people? Extremely dIfficult     PHQ-9 Total Score: 10        REMI 7 anxiety screening tool that patient filled out virtually reviewed by this APRN at today's encounter.    PROMIS scale screening tool that patient filled out virtually reviewed by this APRN at today's encounter.    [facility] request number [id number] reviewed by this APRN at today's encounter.    Previous Provider notes and available records reviewed by this APRN at today's encounter.         Lab Results:   No visits with results within 1 Month(s) from this visit.   Latest known visit with results is:   Office Visit on 09/17/2021   Component Date  Value Ref Range Status   • Glucose 09/17/2021 82  65 - 99 mg/dL Final   • BUN 09/17/2021 13  6 - 20 mg/dL Final   • Creatinine 09/17/2021 0.88  0.76 - 1.27 mg/dL Final   • Sodium 09/17/2021 139  136 - 145 mmol/L Final   • Potassium 09/17/2021 4.5  3.5 - 5.2 mmol/L Final   • Chloride 09/17/2021 98  98 - 107 mmol/L Final   • CO2 09/17/2021 29.6* 22.0 - 29.0 mmol/L Final   • Calcium 09/17/2021 9.6  8.6 - 10.5 mg/dL Final   • Total Protein 09/17/2021 7.6  6.0 - 8.5 g/dL Final   • Albumin 09/17/2021 4.80  3.50 - 5.20 g/dL Final   • ALT (SGPT) 09/17/2021 31  1 - 41 U/L Final   • AST (SGOT) 09/17/2021 23  1 - 40 U/L Final   • Alkaline Phosphatase 09/17/2021 83  39 - 117 U/L Final   • Total Bilirubin 09/17/2021 0.5  0.0 - 1.2 mg/dL Final   • eGFR Non  Amer 09/17/2021 107  >60 mL/min/1.73 Final   • Globulin 09/17/2021 2.8  gm/dL Final   • A/G Ratio 09/17/2021 1.7  g/dL Final   • BUN/Creatinine Ratio 09/17/2021 14.8  7.0 - 25.0 Final   • Anion Gap 09/17/2021 11.4  5.0 - 15.0 mmol/L Final   • TSH 09/17/2021 1.540  0.270 - 4.200 uIU/mL Final   • Vitamin B-12 09/17/2021 570  211 - 946 pg/mL Final   • 25 Hydroxy, Vitamin D 09/17/2021 36.8  ng/ml Final   • Hemoglobin A1C 09/17/2021 5.20  4.80 - 5.60 % Final   • WBC 09/17/2021 5.71  3.40 - 10.80 10*3/mm3 Final   • RBC 09/17/2021 5.84* 4.14 - 5.80 10*6/mm3 Final   • Hemoglobin 09/17/2021 15.7  13.0 - 17.7 g/dL Final   • Hematocrit 09/17/2021 46.9  37.5 - 51.0 % Final   • MCV 09/17/2021 80.3  79.0 - 97.0 fL Final   • MCH 09/17/2021 26.9  26.6 - 33.0 pg Final   • MCHC 09/17/2021 33.5  31.5 - 35.7 g/dL Final   • RDW 09/17/2021 12.8  12.3 - 15.4 % Final   • RDW-SD 09/17/2021 36.2* 37.0 - 54.0 fl Final   • MPV 09/17/2021 10.5  6.0 - 12.0 fL Final   • Platelets 09/17/2021 241  140 - 450 10*3/mm3 Final   • Neutrophil % 09/17/2021 51.4  42.7 - 76.0 % Final   • Lymphocyte % 09/17/2021 35.2  19.6 - 45.3 % Final   • Monocyte % 09/17/2021 11.7  5.0 - 12.0 % Final   • Eosinophil %  09/17/2021 0.7  0.3 - 6.2 % Final   • Basophil % 09/17/2021 0.5  0.0 - 1.5 % Final   • Immature Grans % 09/17/2021 0.5  0.0 - 0.5 % Final   • Neutrophils, Absolute 09/17/2021 2.93  1.70 - 7.00 10*3/mm3 Final   • Lymphocytes, Absolute 09/17/2021 2.01  0.70 - 3.10 10*3/mm3 Final   • Monocytes, Absolute 09/17/2021 0.67  0.10 - 0.90 10*3/mm3 Final   • Eosinophils, Absolute 09/17/2021 0.04  0.00 - 0.40 10*3/mm3 Final   • Basophils, Absolute 09/17/2021 0.03  0.00 - 0.20 10*3/mm3 Final   • Immature Grans, Absolute 09/17/2021 0.03  0.00 - 0.05 10*3/mm3 Final   • nRBC 09/17/2021 0.0  0.0 - 0.2 /100 WBC Final         Assessment/Plan   Problems Addressed this Visit     None      Visit Diagnoses     Attention deficit hyperactivity disorder, combined type  (Chronic)   -  Primary    Relevant Medications    buPROPion XL (Wellbutrin XL) 150 MG 24 hr tablet    Major depressive disorder, recurrent episode, mild with anxious distress (HCC)  (Chronic)       Relevant Medications    buPROPion XL (Wellbutrin XL) 150 MG 24 hr tablet      Diagnoses       Codes Comments    Attention deficit hyperactivity disorder, combined type    -  Primary ICD-10-CM: F90.2  ICD-9-CM: 314.01     Major depressive disorder, recurrent episode, mild with anxious distress (HCC)     ICD-10-CM: F33.0  ICD-9-CM: 296.31           Visit Diagnoses:    ICD-10-CM ICD-9-CM   1. Attention deficit hyperactivity disorder, combined type  F90.2 314.01   2. Major depressive disorder, recurrent episode, mild with anxious distress (HCC)  F33.0 296.31          GOALS:  Short Term Goals: Patient will be compliant with medication, and patient will have no significant medication related side effects.  Patient will be engaged in psychotherapy as indicated.  Patient will report subjective improvement of symptoms.  Long term goals: To stabilize mood and treat/improve subjective symptoms, the patient will stay out of the hospital, the patient will be at an optimal level of functioning,  "and the patient will take all medications as prescribed.  The patient verbalized understanding and agreement with goals that were mutually set.      TREATMENT PLAN:   Continue supportive psychotherapy efforts and medications as indicated.   -Continue hydroxyzine 10-20 mg PO TID PRN for anxiety from PCP. This APRN will take over prescription when pt needs a refill  -Continue Wellbutrin  mg PO QAM for depression  -Will email pt's PCP and ask about UDS results. Pending results will order Focalin XR 10 mg PO QAM for ADHD    Medication and treatment options, both pharmacological and non-pharmacological treatment options, discussed during today's visit, including any off label use of medication. Patient acknowledged and verbally consented with current treatment plan and was educated on the importance of compliance with treatment and follow-up appointments.      Controlled Substance Medication Contract    Controlled substance medications (i.e. benzodiazepines, opioids, amphetamines) are very useful, but have a high potential for misuse and are, therefore, closely controlled by local, state, and federal government(s). As a patient of [facility], you agree and understand the followin. I am responsible for the controlled substance medications prescribed to me. If my prescription is misplaced, stolen, or if \"I run out early,\" I understand this medication will not be replaced regardless of the circumstances.  2. Refills of controlled substance medications: (a) Will be made only during regular office hours Monday-Thursday once a month and during a scheduled virtual appointment. Refills will not be made at night, weekends, or on holidays. (b) Will not be made if \"I lost my prescriptions,\" \"ran out early,\" or \"misplaced my medication\". I am solely responsible for taking the medication as prescribed and for keeping track of the remaining.   3. I agree to comply with urine drug testing and " pill counts at the provider's discretion, thereby, documenting the proper use of any medications. If alcohol abuse is suspected, a breathalyzer or blood alcohol level may be ordered. Unannounced urine or serum toxicology specimens may be requested and my cooperation is required.  4. I understand that if I violate any of the above conditions, my prescriptions for controlled medications my be terminated. If the violation involves obtaining these medications from another individual, or the concomitant use of non-prescription illicit (illegal) drugs, I may also be reported to other providers, pharmacies, medical facilities and the appropriate authorities.   5. I further understand that if I violate this controlled substance contract due to non-compliance of medical directions, such as the failure in taking medications as prescribed, utilizing other illicit drugs, or abuse of controlled medications, the prescription for controlled medications may be terminated.   6. I agree to keep my scheduled appointments and conduct myself in a courteous manner.  7. I agree not to sell, share, or give any medication to another person. I understand that such mishandling of my medication is a serious violation of this agreement and would result in my treatment being terminated without any recourse for appeal.   8. I agree not to take my medication from any physicians, nurse practitioners, pharmacies or other sources without telling my prescriber.  9. I agree to take my medication as my prescriber has instructed and not to alter the way I take my medication without first consulting my prescriber.  10. I agree to abstain from problematic alcohol usage, opioids, marijuana, cocaine, and other addictive substances.   11. If I am legally involved related to legal or illegal drugs, including alcohol, refill of controlled substances will not be given until a re-evaluation of my chemical dependency treatment plan has been completed. Refills are  at the discretion of the prescriber.   12. I agree to fill all of my controlled medications at an in-state ([place]) pharmacy.   13. I understand that [facility] utilizes the [place] All Schedule Prescription Electronic Reporting (JOSE LUIS) system and will monitor my prescription history via this source.  14. Benzodiazepines are drugs prescribed to treat conditions like anxiety, insomnia, and seizures. Examples of these drugs include: alprazolam, clonazepam, diazepam, and lorazepam. The FDA has applied a Black Box Warning (one of the strictest warnings) that the use of opioids and benzodiazepines together have serious risks to include unusual dizziness or lightheadedness, extreme sleepiness, slowed or difficult breathing, coma and death. There is an added risk if alcohol is also ingested. It is the policy of [facility] to NOT prescribe benzodiazepines to patients who also use opioids. If a patient already presents already prescribed both, the prescriber my direct the patient to their previous provider who prescribed it or taper the benzodiazepine as part of the treatment plan. These patients must be monitored at appropriate intervals and so visits may be more frequent.     This APRN has discussed and reviewed this information with the patient and/or guardian. The patient and/or guardian verbally agreed (no signatures are obtained during today's visit as they are a telehealth patient and is unable to print and sign this document, therefore, verbal agreement has been obtained).       MEDICATION ISSUES:    Discussed treatment plan and medication options of prescribed medication as well as the risks, benefits, any black box warnings, and side effects including potential falls, possible impaired driving, and metabolic adversities among others, including any off label use of medication. Patient is agreeable to call the office with any worsening of symptoms or  onset of side effects, or if any concerns or questions arise.  The contact information for the office is made available to the patient. Patient is agreeable to call 911 or go to the nearest ER should they begin having any SI/HI, or if any urgent concerns arise. No medication side effects or related complaints today.       MEDS ORDERED DURING VISIT:  New Medications Ordered This Visit   Medications   • buPROPion XL (Wellbutrin XL) 150 MG 24 hr tablet     Sig: Take 1 tablet PO QAM     Dispense:  30 tablet     Refill:  0       Return in about 4 weeks (around 12/21/2021), or if symptoms worsen or fail to improve, for Recheck.     Treatment plan completed: 10/28/21    Progress toward goal: Not at goal    Functional Status: Moderate impairment     Prognosis: Good with Ongoing Treatment         This document has been electronically signed by MITCHEL Billings  November 23, 2021 15:21 EST     Some of the data in this electronic note has been brought forward from a previous encounter, any necessary changes have been made, it has been reviewed by this APRN, and it is accurate.    Please note that portions of this note were completed with a voice recognition program. Efforts were made to edit dictation, but occasionally words are mistranscribed.

## 2021-11-23 NOTE — TELEPHONE ENCOUNTER
----- Message from MITCHEL Billings sent at 11/23/2021  4:35 PM EST -----  Regarding: RE: UDS  Thank you so much! I appreciate it!  ----- Message -----  From: Annmarie Hamilton APRN  Sent: 11/23/2021   4:35 PM EST  To: MITCHEL Billings  Subject: RE: UDS                                          It usually does not take this long.  I have messaged my practice manager to look into this.    Annmarie  ----- Message -----  From: Karyn Gregory APRN  Sent: 11/23/2021   3:12 PM EST  To: MITCHEL Guardado  Subject: UDS                                                Annmarie Wallace. Our mutual patient completed a UDS about 12 days ago, but it hasn't shown up in the system yet. Does it usually take this long? Also, thank you for the referral.       Best,MITCHEL Billings

## 2021-11-30 ENCOUNTER — TELEPHONE (OUTPATIENT)
Dept: PSYCHIATRY | Facility: CLINIC | Age: 21
End: 2021-11-30

## 2021-11-30 ENCOUNTER — TELEPHONE (OUTPATIENT)
Dept: INTERNAL MEDICINE | Facility: CLINIC | Age: 21
End: 2021-11-30

## 2021-11-30 NOTE — TELEPHONE ENCOUNTER
Caller: Gabriele Miguel    Relationship: Self    Best call back number: [phone]    What test was performed: DRUG SCREEN     When was the test performed: 3 WEEKS AGO     Where was the test performed: IN OFFICE     Additional notes:

## 2021-12-02 ENCOUNTER — TELEPHONE (OUTPATIENT)
Dept: INTERNAL MEDICINE | Facility: CLINIC | Age: 21
End: 2021-12-02

## 2021-12-02 ENCOUNTER — TELEPHONE (OUTPATIENT)
Dept: PSYCHIATRY | Facility: CLINIC | Age: 21
End: 2021-12-02

## 2021-12-02 DIAGNOSIS — F90.2 ATTENTION DEFICIT HYPERACTIVITY DISORDER, COMBINED TYPE: Primary | ICD-10-CM

## 2021-12-02 RX ORDER — DEXMETHYLPHENIDATE HYDROCHLORIDE 10 MG/1
10 CAPSULE, EXTENDED RELEASE ORAL EVERY MORNING
Qty: 30 CAPSULE | Refills: 0 | OUTPATIENT
Start: 2021-12-02 | End: 2021-12-13

## 2021-12-02 NOTE — TELEPHONE ENCOUNTER
Gabriele Miguel [birth year]. He has been waiting an extremely long time for his UDS to come back. His PCP finally messaged me and told me that his UDS has been uploaded. It is clean. Can you see if Destiney will send in the stimulant I discussed in my last progress note so he doesn't have to wait until Sunday.     Message from provider, she is out of town

## 2021-12-02 NOTE — TELEPHONE ENCOUNTER
----- Message from MITCHEL Billings sent at 12/2/2021 12:26 PM EST -----  Regarding: RE:   Thank you so much!  ----- Message -----  From: Annmarie Hamilton APRN  Sent: 12/2/2021  12:20 PM EST  To: MITCHEL Billings    Just wanted to let you know that Gabriele's UDS was finally uploaded.  It is in the media section of his chart.    MITCHEL Wong

## 2021-12-02 NOTE — TELEPHONE ENCOUNTER
The medication has been sent in per the Provider's request to cover while she is out of town, please let the patient know the medicine has been sent in, but not to start it until tomorrow morning as it is too late into the day today to start the medication.  Thanks.

## 2021-12-09 ENCOUNTER — TELEPHONE (OUTPATIENT)
Dept: INTERNAL MEDICINE | Facility: CLINIC | Age: 21
End: 2021-12-09

## 2021-12-09 NOTE — TELEPHONE ENCOUNTER
Pt just wanted to make sure that Karyn Gregory had received the results. Advised that Annmarie had sent a msg with results to Karyn on 12/2/21 and Karyn acknowledged results. Pt verbalized understanding

## 2021-12-09 NOTE — TELEPHONE ENCOUNTER
Caller: Gabriele Miguel    Relationship: Self    Best call back number: [phone] (H)    Caller requesting test results: YES    What test was performed:UDS    When was the test performed: 11/11/21    Where was the test performed: WITH IN [facility]

## 2021-12-13 ENCOUNTER — TELEPHONE (OUTPATIENT)
Dept: PSYCHIATRY | Facility: CLINIC | Age: 21
End: 2021-12-13

## 2021-12-13 ENCOUNTER — HOSPITAL ENCOUNTER (EMERGENCY)
Facility: HOSPITAL | Age: 21
Discharge: HOME | End: 2021-12-13
Attending: EMERGENCY MEDICINE | Admitting: EMERGENCY MEDICINE

## 2021-12-13 ENCOUNTER — TELEPHONE (OUTPATIENT)
Dept: INTERNAL MEDICINE | Facility: CLINIC | Age: 21
End: 2021-12-13

## 2021-12-13 VITALS
WEIGHT: 194 LBS | OXYGEN SATURATION: 96 % | HEART RATE: 85 BPM | HEIGHT: 73 IN | BODY MASS INDEX: 25.71 KG/M2 | TEMPERATURE: 98 F | DIASTOLIC BLOOD PRESSURE: 94 MMHG | RESPIRATION RATE: 16 BRPM | SYSTOLIC BLOOD PRESSURE: 160 MMHG

## 2021-12-13 DIAGNOSIS — T88.7XXA MEDICATION SIDE EFFECT: ICD-10-CM

## 2021-12-13 DIAGNOSIS — R00.2 PALPITATION: Primary | ICD-10-CM

## 2021-12-13 LAB
ALBUMIN SERPL-MCNC: 4.7 G/DL (ref 3.5–5.2)
ALBUMIN/GLOB SERPL: 1.6 G/DL
ALP SERPL-CCNC: 92 U/L (ref 39–117)
ALT SERPL W P-5'-P-CCNC: 25 U/L (ref 1–41)
ANION GAP SERPL CALCULATED.3IONS-SCNC: 13 MMOL/L (ref 5–15)
AST SERPL-CCNC: 28 U/L (ref 1–40)
BASOPHILS # BLD AUTO: 0.01 10*3/MM3 (ref 0–0.2)
BASOPHILS NFR BLD AUTO: 0.2 % (ref 0–1.5)
BILIRUB SERPL-MCNC: 0.3 MG/DL (ref 0–1.2)
BUN SERPL-MCNC: 16 MG/DL (ref 6–20)
BUN/CREAT SERPL: 18 (ref 7–25)
CALCIUM SPEC-SCNC: 9.6 MG/DL (ref 8.6–10.5)
CHLORIDE SERPL-SCNC: 99 MMOL/L (ref 98–107)
CO2 SERPL-SCNC: 26 MMOL/L (ref 22–29)
CREAT SERPL-MCNC: 0.89 MG/DL (ref 0.76–1.27)
DEPRECATED RDW RBC AUTO: 37.2 FL (ref 37–54)
EOSINOPHIL # BLD AUTO: 0.05 10*3/MM3 (ref 0–0.4)
EOSINOPHIL NFR BLD AUTO: 0.8 % (ref 0.3–6.2)
ERYTHROCYTE [DISTWIDTH] IN BLOOD BY AUTOMATED COUNT: 12.5 % (ref 12.3–15.4)
GFR SERPL CREATININE-BSD FRML MDRD: 106 ML/MIN/1.73
GLOBULIN UR ELPH-MCNC: 2.9 GM/DL
GLUCOSE SERPL-MCNC: 99 MG/DL (ref 65–99)
HCT VFR BLD AUTO: 45.2 % (ref 37.5–51)
HGB BLD-MCNC: 15.3 G/DL (ref 13–17.7)
HOLD SPECIMEN: NORMAL
IMM GRANULOCYTES # BLD AUTO: 0.04 10*3/MM3 (ref 0–0.05)
IMM GRANULOCYTES NFR BLD AUTO: 0.6 % (ref 0–0.5)
LYMPHOCYTES # BLD AUTO: 1.49 10*3/MM3 (ref 0.7–3.1)
LYMPHOCYTES NFR BLD AUTO: 23.2 % (ref 19.6–45.3)
MAGNESIUM SERPL-MCNC: 1.8 MG/DL (ref 1.6–2.6)
MCH RBC QN AUTO: 27.9 PG (ref 26.6–33)
MCHC RBC AUTO-ENTMCNC: 33.8 G/DL (ref 31.5–35.7)
MCV RBC AUTO: 82.3 FL (ref 79–97)
MONOCYTES # BLD AUTO: 0.52 10*3/MM3 (ref 0.1–0.9)
MONOCYTES NFR BLD AUTO: 8.1 % (ref 5–12)
NEUTROPHILS NFR BLD AUTO: 4.31 10*3/MM3 (ref 1.7–7)
NEUTROPHILS NFR BLD AUTO: 67.1 % (ref 42.7–76)
NRBC BLD AUTO-RTO: 0 /100 WBC (ref 0–0.2)
PLATELET # BLD AUTO: 250 10*3/MM3 (ref 140–450)
PMV BLD AUTO: 10.6 FL (ref 6–12)
POTASSIUM SERPL-SCNC: 4.2 MMOL/L (ref 3.5–5.2)
PROT SERPL-MCNC: 7.6 G/DL (ref 6–8.5)
QT INTERVAL: 334 MS
QTC INTERVAL: 406 MS
RBC # BLD AUTO: 5.49 10*6/MM3 (ref 4.14–5.8)
SODIUM SERPL-SCNC: 138 MMOL/L (ref 136–145)
T4 FREE SERPL-MCNC: 1.4 NG/DL (ref 0.93–1.7)
TSH SERPL DL<=0.05 MIU/L-ACNC: 2.73 UIU/ML (ref 0.27–4.2)
WBC NRBC COR # BLD: 6.42 10*3/MM3 (ref 3.4–10.8)
WHOLE BLOOD HOLD SPECIMEN: NORMAL
WHOLE BLOOD HOLD SPECIMEN: NORMAL

## 2021-12-13 PROCEDURE — 99283 EMERGENCY DEPT VISIT LOW MDM: CPT

## 2021-12-13 PROCEDURE — 84439 ASSAY OF FREE THYROXINE: CPT | Performed by: EMERGENCY MEDICINE

## 2021-12-13 PROCEDURE — 93005 ELECTROCARDIOGRAM TRACING: CPT | Performed by: EMERGENCY MEDICINE

## 2021-12-13 PROCEDURE — 85025 COMPLETE CBC W/AUTO DIFF WBC: CPT | Performed by: EMERGENCY MEDICINE

## 2021-12-13 PROCEDURE — 93005 ELECTROCARDIOGRAM TRACING: CPT

## 2021-12-13 PROCEDURE — 84443 ASSAY THYROID STIM HORMONE: CPT | Performed by: EMERGENCY MEDICINE

## 2021-12-13 PROCEDURE — 80053 COMPREHEN METABOLIC PANEL: CPT | Performed by: EMERGENCY MEDICINE

## 2021-12-13 PROCEDURE — 83735 ASSAY OF MAGNESIUM: CPT | Performed by: EMERGENCY MEDICINE

## 2021-12-13 RX ORDER — SODIUM CHLORIDE 0.9 % (FLUSH) 0.9 %
10 SYRINGE (ML) INJECTION AS NEEDED
Status: DISCONTINUED | OUTPATIENT
Start: 2021-12-13 | End: 2021-12-13 | Stop reason: HOSPADM

## 2021-12-13 NOTE — TELEPHONE ENCOUNTER
Called patient back, told him to discontinue medication and report to emergency room. Also asked him to call back with an update after being seen at ER

## 2021-12-13 NOTE — TELEPHONE ENCOUNTER
Patient called has been taking Bupropion for 5 days and today he says its affecting his heart he is having some tightness and a litle sweating his heart is speeding up and slowing down with some palpitations. I told him to report to emergency room he said it was feeling somewhat better but he would go if it worsens. I advised that I was sending you a note and would contact him back once I hear from you.    Please advise?    Thank You

## 2021-12-13 NOTE — DISCHARGE INSTRUCTIONS
Stop your Focalin.  Continue your other medications    No stimulant medications including caffeine Sudafed or any other stimulants    Follow-up with your mental health care professional and discuss the discontinuation of your Focalin and for further instructions and continue follow-up    Follow-up with your PCP for reevaluation of the palpitations though seems that this is due to your medication    Return to the ED once if you have any acute or emergent significant or progressive symptoms as discussed

## 2021-12-13 NOTE — TELEPHONE ENCOUNTER
He went to ER concerning heart palpatations. He had an EKG and blood work.  His EKG was normal and the doctor suspects the bloodwork should be fine as well.  The ER physician seems to think his issues were caused by his medication.

## 2021-12-13 NOTE — ED PROVIDER NOTES
Subjective   Mr. Gabriele Miguel is a 21 y.o. male who presents to the ED with medication reaction. He was prescribed dexmethylphenidate as well as Wellbutrin for ADHD which has been taking for 5-6 days. He reports he has been experiencing palpitations since, with significant increase today. He denies caffeine use or stimulant medication use, but did start drinking Mountain Dew when he came to the ED. He does have a history of similar but milder symptoms prior to taking the current ADHD medicine. He was treated with antianxiety medicines for this and he remains on those, which yielded improvement of his palpitations and anxiety. He denies headache change in vision speech cognition numbness or weakness. He has had no chest pain. Denies any leg pain redness swelling history of DVT or PE. He denies any illicit drug or alcohol use. He sees mental health professional that prescribes all of his anxiety and ADHD medications.    He denies nausea, vomiting, diarrhea, or dizziness. He further denies fever, chills, cough, congestion, or shortness of breath. He has received COVID immunization. He works for an [employer] company. There are no other acute symptoms at this time.      History provided by:  Patient  Allergic Reaction  Presenting symptoms comment:  Palpitations  Severity:  Moderate  Duration:  5 days  Prior allergic episodes:  No prior episodes  Relieved by:  Nothing  Worsened by:  Nothing      Review of Systems   Constitutional: Negative.  Negative for chills and fever.   HENT: Negative.  Negative for congestion.    Eyes: Negative.    Respiratory: Negative.  Negative for cough and shortness of breath.    Cardiovascular: Positive for palpitations.   Gastrointestinal: Negative.  Negative for diarrhea, nausea and vomiting.   Genitourinary: Negative.  Negative for dysuria.   Skin: Negative.    Neurological: Negative.  Negative for dizziness, tremors, syncope, weakness, numbness and headaches.   All other systems reviewed and  are negative.      Past Medical History:   Diagnosis Date   • ADHD (attention deficit hyperactivity disorder)    • Anxiety    • Asthma    • Depression        Allergies   Allergen Reactions   • Dexmethylphenidate Other (See Comments)     Racing heart        No past surgical history on file.    Family History   Problem Relation Age of Onset   • Diabetes Mother    • Hypertension Father    • Drug abuse Father    • Thyroid disease Sister         hashimotos   • No Known Problems Maternal Grandmother    • Diabetes type II Maternal Grandfather    • No Known Problems Paternal Grandfather    • ADD / ADHD Sister        Social History     Socioeconomic History   • Marital status: Single   Tobacco Use   • Smoking status: Never Smoker   • Smokeless tobacco: Never Used   Vaping Use   • Vaping Use: Every day   Substance and Sexual Activity   • Alcohol use: Yes     Comment: socially   • Drug use: Yes     Types: Marijuana     Comment: Rarely, once in the 6 months   • Sexual activity: Yes     Partners: Female     Comment: engaged         Objective   Physical Exam  Vitals and nursing note reviewed.   Constitutional:       General: He is not in acute distress.     Appearance: He is well-developed.   HENT:      Head: Normocephalic and atraumatic.      Nose: Nose normal.   Eyes:      General: No scleral icterus.     Conjunctiva/sclera: Conjunctivae normal.      Comments: No nystagmus.   Cardiovascular:      Rate and Rhythm: Normal rate and regular rhythm.      Heart sounds: Normal heart sounds. No murmur heard.       Comments: No tachycardia. Regular rhythm without murmurs rubs or gallops. No transient irregularities  Pulmonary:      Effort: Pulmonary effort is normal. No respiratory distress.      Breath sounds: Normal breath sounds.   Abdominal:      Palpations: Abdomen is soft.      Tenderness: There is no abdominal tenderness.   Musculoskeletal:         General: Normal range of motion.      Cervical back: Normal range of motion and  neck supple.   Skin:     General: Skin is warm and dry.   Neurological:      Mental Status: He is alert and oriented to person, place, and time.      Cranial Nerves: No cranial nerve deficit.      Sensory: No sensory deficit.      Motor: No weakness.      Deep Tendon Reflexes: Reflexes normal.   Psychiatric:         Behavior: Behavior normal.         Procedures         ED Course  ED Course as of 12/14/21 0043   Mon Dec 13, 2021   1522 I discussed findings with the patient.  He is neurologically intact.  He is not tachycardic.  He has a mental health professional that is prescribing his dexmethylphenidate.  I discussed evaluation and will check his thyroids electrolytes and hematocrit.  We do not have a bed formally for him to be seen and have seen him in a private area in the ED.  I discussed evaluation and the patient is in agreement []   1524 The patient was drinking Mountain Dew as well.  Have had him stop that.  He has had no other stimulant medications.  Will observe until his laboratory evaluation is returned [HH]      ED Course User Index  [HH] Misha Reyna MD     Recent Results (from the past 24 hour(s))   ECG 12 Lead    Collection Time: 12/13/21  1:37 PM   Result Value Ref Range    QT Interval 334 ms    QTC Interval 406 ms   Green Top (Gel)    Collection Time: 12/13/21  1:41 PM   Result Value Ref Range    Extra Tube Hold for add-ons.    Lavender Top    Collection Time: 12/13/21  1:41 PM   Result Value Ref Range    Extra Tube hold for add-on    Gold Top - SST    Collection Time: 12/13/21  1:41 PM   Result Value Ref Range    Extra Tube Hold for add-ons.    Gray Top    Collection Time: 12/13/21  1:41 PM   Result Value Ref Range    Extra Tube Hold for add-ons.    Light Blue Top    Collection Time: 12/13/21  1:41 PM   Result Value Ref Range    Extra Tube hold for add-on    Comprehensive Metabolic Panel    Collection Time: 12/13/21  1:41 PM    Specimen: Blood   Result Value Ref Range    Glucose 99 65 - 99  mg/dL    BUN 16 6 - 20 mg/dL    Creatinine 0.89 0.76 - 1.27 mg/dL    Sodium 138 136 - 145 mmol/L    Potassium 4.2 3.5 - 5.2 mmol/L    Chloride 99 98 - 107 mmol/L    CO2 26.0 22.0 - 29.0 mmol/L    Calcium 9.6 8.6 - 10.5 mg/dL    Total Protein 7.6 6.0 - 8.5 g/dL    Albumin 4.70 3.50 - 5.20 g/dL    ALT (SGPT) 25 1 - 41 U/L    AST (SGOT) 28 1 - 40 U/L    Alkaline Phosphatase 92 39 - 117 U/L    Total Bilirubin 0.3 0.0 - 1.2 mg/dL    eGFR Non African Amer 106 >60 mL/min/1.73    Globulin 2.9 gm/dL    A/G Ratio 1.6 g/dL    BUN/Creatinine Ratio 18.0 7.0 - 25.0    Anion Gap 13.0 5.0 - 15.0 mmol/L   T4, Free    Collection Time: 12/13/21  1:41 PM    Specimen: Blood   Result Value Ref Range    Free T4 1.40 0.93 - 1.70 ng/dL   TSH    Collection Time: 12/13/21  1:41 PM    Specimen: Blood   Result Value Ref Range    TSH 2.730 0.270 - 4.200 uIU/mL   Magnesium    Collection Time: 12/13/21  1:41 PM    Specimen: Blood   Result Value Ref Range    Magnesium 1.8 1.6 - 2.6 mg/dL   CBC Auto Differential    Collection Time: 12/13/21  1:41 PM    Specimen: Blood   Result Value Ref Range    WBC 6.42 3.40 - 10.80 10*3/mm3    RBC 5.49 4.14 - 5.80 10*6/mm3    Hemoglobin 15.3 13.0 - 17.7 g/dL    Hematocrit 45.2 37.5 - 51.0 %    MCV 82.3 79.0 - 97.0 fL    MCH 27.9 26.6 - 33.0 pg    MCHC 33.8 31.5 - 35.7 g/dL    RDW 12.5 12.3 - 15.4 %    RDW-SD 37.2 37.0 - 54.0 fl    MPV 10.6 6.0 - 12.0 fL    Platelets 250 140 - 450 10*3/mm3    Neutrophil % 67.1 42.7 - 76.0 %    Lymphocyte % 23.2 19.6 - 45.3 %    Monocyte % 8.1 5.0 - 12.0 %    Eosinophil % 0.8 0.3 - 6.2 %    Basophil % 0.2 0.0 - 1.5 %    Immature Grans % 0.6 (H) 0.0 - 0.5 %    Neutrophils, Absolute 4.31 1.70 - 7.00 10*3/mm3    Lymphocytes, Absolute 1.49 0.70 - 3.10 10*3/mm3    Monocytes, Absolute 0.52 0.10 - 0.90 10*3/mm3    Eosinophils, Absolute 0.05 0.00 - 0.40 10*3/mm3    Basophils, Absolute 0.01 0.00 - 0.20 10*3/mm3    Immature Grans, Absolute 0.04 0.00 - 0.05 10*3/mm3    nRBC 0.0 0.0 - 0.2 /100  "WBC     Note: In addition to lab results from this visit, the labs listed above may include labs taken at another facility or during a different encounter within the last 24 hours. Please correlate lab times with ED admission and discharge times for further clarification of the services performed during this visit.    No orders to display     Vitals:    12/13/21 1325   BP: 160/94   BP Location: Left arm   Patient Position: Sitting   Pulse: 85   Resp: 16   Temp: 98 °F (36.7 °C)   TempSrc: Tympanic   SpO2: 96%   Weight: 88 kg (194 lb)   Height: 185.4 cm (73\")     Medications - No data to display  ECG/EMG Results (last 24 hours)     Procedure Component Value Units Date/Time    ECG 12 Lead [[id number]] Collected: 12/13/21 1337     Updated: 12/13/21 1338        ECG 12 Lead   Final Result   Test Reason : PAIN   Blood Pressure :   */*   mmHG   Vent. Rate :  89 BPM     Atrial Rate :  89 BPM      P-R Int : 144 ms          QRS Dur :  90 ms       QT Int : 334 ms       P-R-T Axes :  69  80  66 degrees      QTc Int : 406 ms      Normal sinus rhythm with sinus arrhythmia   Normal ECG   No previous ECGs available   Confirmed by HIGINIO REYNA MD ([id number]) on 12/13/2021 3:20:04 PM      Referred By:            Confirmed By: HIGINIO REYNA MD                                               [facility]    Final diagnoses:   Palpitation   Medication side effect       Documentation assistance provided by niraj Benites.  Information recorded by the scribe was done at my direction and has been verified and validated by me.     Ernesto Benites  12/13/21 1536       Higinio Reyna MD  12/14/21 0043    "

## 2021-12-14 ENCOUNTER — OFFICE VISIT (OUTPATIENT)
Dept: INTERNAL MEDICINE | Facility: CLINIC | Age: 21
End: 2021-12-14

## 2021-12-14 VITALS
WEIGHT: 197 LBS | OXYGEN SATURATION: 99 % | HEIGHT: 73 IN | HEART RATE: 99 BPM | SYSTOLIC BLOOD PRESSURE: 152 MMHG | TEMPERATURE: 97.3 F | BODY MASS INDEX: 26.11 KG/M2 | DIASTOLIC BLOOD PRESSURE: 80 MMHG | RESPIRATION RATE: 20 BRPM

## 2021-12-14 DIAGNOSIS — R00.2 PALPITATIONS: Primary | ICD-10-CM

## 2021-12-14 DIAGNOSIS — F90.9 ATTENTION DEFICIT HYPERACTIVITY DISORDER (ADHD), UNSPECIFIED ADHD TYPE: ICD-10-CM

## 2021-12-14 PROCEDURE — 99213 OFFICE O/P EST LOW 20 MIN: CPT | Performed by: PHYSICIAN ASSISTANT

## 2021-12-14 NOTE — ASSESSMENT & PLAN NOTE
Refer to cardiology for evaluation. Hx of palpitations but worsened with focalin, focalin has been d/c.

## 2021-12-14 NOTE — PROGRESS NOTES
Chief Complaint  Rapid Heart Rate    Subjective     {CC  Problem List  Visit Diagnosis   Encounters  Notes  Medications  Labs  Result Review Imaging  Media :23}     History of Present Illness  Gabriele Miguel presents to [facility] for   Tachycardia:  Here for f/u of ER visit. He started taking focalin 6d ago and was doing well on it but then had episode of fast HR and palpitations while he was at work. He has had palpitations in the past but they were not as severe, they were brief, no CP/SOA and he associated them with anxiety. Went to ER due to severe sx and they stopped his focalin. He has not had his med today. Had elevated BP in the ER and a little high here today but no hx of HTN. Would like to see cardio for these palpitations, also due to fhx of heart valve problem in his sister and heart disease in his father.     ADHD:  Has stopped focalin d/t side effects, next f/u with psych is next week.     Anxiety:  Is doing well on wellbutrin, has atarax to take prn for anxiety and that works well.       Review of Systems   Constitutional: Negative for fever and unexpected weight loss.   Respiratory: Negative for cough, shortness of breath and wheezing.    Cardiovascular: Negative for chest pain and palpitations.       The following portions of the patient's history were reviewed and updated as appropriate: allergies, current medications, past family history, past medical history, past social history, past surgical history and problem list.  Allergies   Allergen Reactions   • Dexmethylphenidate Other (See Comments)     Racing heart      Current Outpatient Medications on File Prior to Visit   Medication Sig Dispense Refill   • buPROPion XL (Wellbutrin XL) 150 MG 24 hr tablet Take 1 tablet PO QAM 30 tablet 0   • albuterol (PROVENTIL HFA;VENTOLIN HFA) 108 (90 BASE) MCG/ACT inhaler Inhale 2 puffs every 4 (four) hours as needed for wheezing.     • hydrOXYzine (ATARAX) 10 MG tablet 1-2  "PO TID PRN for anxiety 90 tablet 1   • [DISCONTINUED] dexmethylphenidate XR (Focalin XR) 10 MG 24 hr capsule Take 1 capsule by mouth Every Morning 30 capsule 0     Current Facility-Administered Medications on File Prior to Visit   Medication Dose Route Frequency Provider Last Rate Last Admin   • [DISCONTINUED] sodium chloride 0.9 % flush 10 mL  10 mL Intravenous PRN Misha Reyna MD           Social History     Tobacco Use   Smoking Status Never Smoker   Smokeless Tobacco Never Used        Objective   Vital Signs:   Vitals:    12/14/21 1520   BP: 152/80   Pulse: 99   Resp: 20   Temp: 97.3 °F (36.3 °C)   TempSrc: Temporal   SpO2: 99%   Weight: 89.4 kg (197 lb)   Height: 185.4 cm (73\")   PainSc: 0-No pain      Physical Exam  Vitals reviewed.   Constitutional:       General: He is not in acute distress.     Appearance: Normal appearance.   HENT:      Head: Normocephalic and atraumatic.   Eyes:      General: No scleral icterus.     Extraocular Movements: Extraocular movements intact.      Conjunctiva/sclera: Conjunctivae normal.   Cardiovascular:      Rate and Rhythm: Normal rate and regular rhythm.      Heart sounds: Normal heart sounds. No murmur heard.      Pulmonary:      Effort: Pulmonary effort is normal. No respiratory distress.      Breath sounds: Normal breath sounds. No stridor. No wheezing or rhonchi.   Musculoskeletal:      Cervical back: Normal range of motion and neck supple.   Skin:     General: Skin is warm and dry.      Coloration: Skin is not jaundiced.   Neurological:      General: No focal deficit present.      Mental Status: He is alert and oriented to person, place, and time.      Gait: Gait normal.   Psychiatric:         Mood and Affect: Mood normal.         Behavior: Behavior normal.        No LMP for male patient.    Result Review :                No orders of the defined types were placed in this encounter.         Assessment and Plan    Diagnoses and all orders for this visit:    1. " Palpitations (Primary)  Assessment & Plan:  Refer to cardiology for evaluation. Hx of palpitations but worsened with focalin, focalin has been d/c.     Orders:  -     Ambulatory Referral to Cardiology    2. Attention deficit hyperactivity disorder (ADHD), unspecified ADHD type  Assessment & Plan:  Chronic. Stable. Adv to discuss medication management with behavioral health and d/c Focalin as dir by ER        Follow Up   Return if symptoms worsen or fail to improve.    Follow up if symptoms worsen or persist or has new or concerning symptoms, go to ER for severe symptoms.   Reviewed common medication effects and side effects and advised to report side effects immediately.  Encouraged medication compliance and the importance of keeping scheduled follow up appointments with me and any other providers.  If a referral was made please contact our office if you have not heard about an appointment in the next 2 weeks.   If labs or images are ordered we will contact you with the results within the next week.  If you have not heard from us after a week please call our office to inquire about the results.   Patient was given instructions and counseling regarding his condition or for health maintenance advice. Please see specific information pulled into the AVS if appropriate.     Aliza Caraballo PA-C    * Please note that portions of this note were completed with a voice recognition program.

## 2021-12-14 NOTE — ASSESSMENT & PLAN NOTE
Chronic. Stable. Adv to discuss medication management with behavioral health and d/c Focalin as dir by JUSTYNA

## 2021-12-15 NOTE — PROGRESS NOTES
"[facility]  [facility] Val[facility]    Chief Complaint  Palpitations and Establish Care    Subjective    History of Present Illness {CC  Problem List  Visit  Diagnosis   Encounters  Notes  Medications  Labs  Result Review Imaging  Media :23}     Gabriele Miguel is a 21 y.o. male with ADHD, anxiety/depression and asthma who presents today for evaluation of palpitations at the request of Aliza Caraballo PA-C.  Patient went to the emergency room on 12/13 with palpitations.  He was prescribed dexmethylphenidate as well as Wellbutrin for ADHD approximately 5 to 6 days prior.  He notes history of palpitations, but the symptoms are much worse. He reports palpitations for years, describes it as \"jumping up and then slamming down\". Occurs sporadically but recently occurring daily. Worse if he stands up fast or bends over. Occasionally with exertion. 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 No shortness of breath or chest pain    Drinks caffeine only on occasion. Only drinks alcohol on occasion    He reports his sister has valve regurgitation and HTN and has to see a cardiologist     Cardiac risks: Gender     Objective     Vital Signs:   Vitals:    12/16/21 1511 12/16/21 1513 12/16/21 1514   BP: 142/79 139/76 139/74   BP Location: Right arm Left arm Left arm   Patient Position: Sitting Standing Sitting   Cuff Size: Adult Adult Adult   Pulse: 89 96 92   Resp:   20   Temp:   97.6 °F (36.4 °C)   TempSrc:   Temporal   SpO2: 100% 100% 100%   Weight:   89.8 kg (198 lb)   Height:   185.4 cm (73\")     Body mass index is 26.12 kg/m².  Physical Exam  Vitals reviewed.   Constitutional:       Appearance: Normal appearance.   HENT:      Head: Normocephalic.   Eyes:      Extraocular Movements: Extraocular movements intact.      Pupils: Pupils are equal, round, and reactive to light.   Neck:      Vascular: No carotid bruit.   Cardiovascular:      Rate and Rhythm: Normal rate and regular " rhythm.      Pulses: Normal pulses.      Heart sounds: Normal heart sounds, S1 normal and S2 normal. No murmur heard.      Pulmonary:      Effort: Pulmonary effort is normal. No respiratory distress.      Breath sounds: Normal breath sounds.   Chest:      Chest wall: No tenderness.   Abdominal:      General: Abdomen is flat. Bowel sounds are normal.      Palpations: Abdomen is soft.   Musculoskeletal:      Cervical back: Neck supple.      Right lower leg: No edema.      Left lower leg: No edema.   Skin:     General: Skin is warm and dry.   Neurological:      General: No focal deficit present.      Mental Status: He is alert and oriented to person, place, and time. Mental status is at baseline.   Psychiatric:         Mood and Affect: Mood normal.         Behavior: Behavior normal.         Thought Content: Thought content normal.              Result Review  Data Reviewed:{ Labs  Result Review  Imaging  Med Tab  Media :23}   Magnesium (12/13/2021 13:41)  TSH (12/13/2021 13:41)  T4, Free (12/13/2021 13:41)  Comprehensive Metabolic Panel (12/13/2021 13:41)  CBC & Differential (12/13/2021 13:41)  ECG 12 Lead (12/13/2021 13:37)    Consultant notes Aliza Caraballo PA-c, ED vsit            Assessment and Plan {CC Problem List  Visit Diagnosis  ROS  Review (Popup)  Health Maintenance  Quality  BestPractice  Medications  SmartSets  SnapShot Encounters  Media :23}   1. Palpitations  - Holter Monitor - 72 Hour Up To 15 Days; Future  - Consider echo if abnormal    2. Elevated blood-pressure reading, without diagnosis of hypertension  Recent BPs elevated. He thinks this may be related to ADHD meds. His fiance is a nurse and he reports that he will have her check his BP and keep a log for our next appt          Follow Up {Instructions Charge Capture  Follow-up Communications :23}   Return in about 6 weeks (around 1/27/2022) for Monitor results, Office follow up.    Patient was given instructions and counseling  regarding his condition or for health maintenance advice. Please see specific information pulled into the AVS if appropriate.  Advised to call the Heart and Valve Center with any questions, concerns, or worsening symptoms.    Dictated Utilizing Dragon Dictation    No

## 2021-12-16 ENCOUNTER — OFFICE VISIT (OUTPATIENT)
Dept: CARDIOLOGY | Facility: HOSPITAL | Age: 21
End: 2021-12-16

## 2021-12-16 ENCOUNTER — HOSPITAL ENCOUNTER (OUTPATIENT)
Dept: CARDIOLOGY | Facility: HOSPITAL | Age: 21
Discharge: HOME | End: 2021-12-16

## 2021-12-16 VITALS
HEART RATE: 92 BPM | OXYGEN SATURATION: 100 % | HEIGHT: 73 IN | DIASTOLIC BLOOD PRESSURE: 74 MMHG | SYSTOLIC BLOOD PRESSURE: 139 MMHG | TEMPERATURE: 97.6 F | WEIGHT: 198 LBS | RESPIRATION RATE: 20 BRPM | BODY MASS INDEX: 26.24 KG/M2

## 2021-12-16 DIAGNOSIS — R00.2 PALPITATIONS: Primary | ICD-10-CM

## 2021-12-16 DIAGNOSIS — R03.0 ELEVATED BLOOD-PRESSURE READING, WITHOUT DIAGNOSIS OF HYPERTENSION: ICD-10-CM

## 2021-12-16 DIAGNOSIS — R00.2 PALPITATIONS: ICD-10-CM

## 2021-12-16 PROCEDURE — 99213 OFFICE O/P EST LOW 20 MIN: CPT | Performed by: NURSE PRACTITIONER

## 2021-12-16 PROCEDURE — 93246 EXT ECG>7D<15D RECORDING: CPT

## 2021-12-16 NOTE — PROGRESS NOTES
[facility] Heart Monitor Documentation    Gabriele Miguel  2000  [id number]  12/16/21    MITCHEL Ibarra    [] ZIO XT Patch  Model [id number] Prescribed for N/A Days    · Serial Number: (N + 9 Digits) N   · Apply-By Date on Box:   · USPS Tracking Number:   · USPS Tracking        [] [employer]ian MINI PLUS Mobile Cardiac Telemetry  Model BGMINIPLUS Prescribed for N/A Days    · Serial Number: (BGM + 7 Digits) BGM  · Shipped-By Date on Box:   · UPS Tracking Number: [id number]  · UPS Tracking      [] [employer]ian MINI Holter Monitor  Model BGMINIEL Prescribed for 14 Days    · Serial Number: (7 Digits) [id number]  · Shipped-By Date on Box:803704  UPS Tracking Number: [id number]  UPS Tracking    This monitor was applied to the patient's chest and checked for proper functioning.  Mr. Gabriele Miguel was instructed in the proper use of this monitor.  He was given the opportunity to ask questions and left the office with the device 's instruction manual.    Nicole Barney MA, 15:44 EST, 12/16/21                  [facility]

## 2021-12-23 ENCOUNTER — TELEMEDICINE (OUTPATIENT)
Dept: PSYCHIATRY | Facility: CLINIC | Age: 21
End: 2021-12-23

## 2021-12-23 DIAGNOSIS — F33.0 MAJOR DEPRESSIVE DISORDER, RECURRENT EPISODE, MILD WITH ANXIOUS DISTRESS (HCC): Chronic | ICD-10-CM

## 2021-12-23 DIAGNOSIS — F90.2 ATTENTION DEFICIT HYPERACTIVITY DISORDER, COMBINED TYPE: Primary | Chronic | ICD-10-CM

## 2021-12-23 PROCEDURE — 99214 OFFICE O/P EST MOD 30 MIN: CPT | Performed by: NURSE PRACTITIONER

## 2021-12-23 NOTE — PROGRESS NOTES
This provider is located at the [facility] (through [facility]), [address], [place], [address] using a secure [place] Video Visit through [employer]. Patient is being seen remotely via telehealth at their home address in [place], and stated they are in a secure environment for this session. The patient's condition being diagnosed/treated is appropriate for telemedicine. The provider identified herself as well as her credentials.   The patient, and/or patients guardian, consent to be seen remotely, and when consent is given they understand that the consent allows for patient identifiable information to be sent to a third party as needed.   They may refuse to be seen remotely at any time. The electronic data is encrypted and password protected, and the patient and/or guardian has been advised of the potential risks to privacy not withstanding such measures.    You have chosen to receive care through a telehealth visit.  Do you consent to use a video/audio connection for your medical care today? Yes        Subjective   Gabriele Miguel is a 21 y.o. male who presents today for follow up    Chief Complaint:  ADHD and depression    Accompanied by:Pt was alone for duration of appointment    History of Present Illness:   Pt had been taking the Focalin XR for about six days before he experienced palpitations and tachycardia. Pt went to the ED, they advised him to discontinue it. Pt is following up with cardio; he currently has a 24 hour Holter Monitor on. He has an appointment at the end of January. Pt took the hydroxyzine a week ago due to a situation with his supervisor. His supervisor made him angry and he was verbally aggressive toward him. Pt states there isn't any hard feelings between the two. He states he staggers the Wellbutrin XL due to sexual sides. It isn't as bad as the Prozac, but the sexual side effects are still present. It has been helping with his depression  "and has been taking it every other day. Sleep is fairly stable as is appetite. The patient denies any abnormal muscle movements or tics. The patient rates their depression at a 2/10 on a 0-10 scale, with 10 being the worst. The patient rates their anxiety at a 4/10  (social anxiety) on a 0-10 scale, with 10 being the worst. The patient would like to change their medications at this visit. The patient denies any suicidal or homicidal ideations, plans, or intent at today's encounter and is convincing.  The patient denies any auditory hallucinations or visual hallucinations. The patient does not endorse any significant symptoms consistent with eyad or psychosis at today's encounter.          Prior Psychiatric Medications:  Prozac - 20 mg is causing impotence  Hydroxyzine -  It mildly helps  Adderall - tried 5-10 mg BID and it made him feel \"run down\".   Focalin XL - tachycardia, palpitations  Wellbutrin XL - mild sexual SEs          The following portions of the patient's history were reviewed and updated as appropriate: allergies, current medications, past family history, past medical history, past social history, past surgical history and problem list.          Past Medical History:  Past Medical History:   Diagnosis Date   • ADHD (attention deficit hyperactivity disorder)    • Anxiety    • Asthma    • Depression        Social History:  Social History     Socioeconomic History   • Marital status: Single   Tobacco Use   • Smoking status: Never Smoker   • Smokeless tobacco: Never Used   Vaping Use   • Vaping Use: Every day   • Start date: 6/1/2019   • Substances: Nicotine, Flavoring   • Devices: Refillable tank   Substance and Sexual Activity   • Alcohol use: Yes     Alcohol/week: 5.0 standard drinks     Types: 5 Shots of liquor per week     Comment: socially mixed drink   • Drug use: Yes     Types: Marijuana     Comment: Rarely, once in the 6 months   • Sexual activity: Yes     Partners: Female     Comment: engaged "       Family History:  Family History   Problem Relation Age of Onset   • Diabetes Mother    • Hypertension Father    • Drug abuse Father    • Thyroid disease Sister         hashimotos   • Other Sister         valve regurgitation   • Hypertension Sister    • No Known Problems Maternal Grandmother    • Diabetes type II Maternal Grandfather    • No Known Problems Paternal Grandfather    • ADD / ADHD Sister        Past Surgical History:  History reviewed. No pertinent surgical history.    Problem List:  Patient Active Problem List   Diagnosis   • Palpitations   • ADHD       Allergy:   Allergies   Allergen Reactions   • Dexmethylphenidate Other (See Comments)     Racing heart         Current Medications:   Current Outpatient Medications   Medication Sig Dispense Refill   • albuterol (PROVENTIL HFA;VENTOLIN HFA) 108 (90 BASE) MCG/ACT inhaler Inhale 2 puffs every 4 (four) hours as needed for wheezing.     • hydrOXYzine (ATARAX) 10 MG tablet 1-2 PO TID PRN for anxiety 90 tablet 1   • Vortioxetine HBr 5 MG tablet Take 5 mg by mouth Daily. 30 tablet 1     No current facility-administered medications for this visit.       Review of Symptoms:    Review of Systems   Constitutional: Positive for appetite change and fatigue.   Psychiatric/Behavioral: Positive for decreased concentration. The patient is nervous/anxious.          Physical Exam:   Due to the remote nature of this encounter (virtual encounter), vitals were unable to be obtained.  Height stated at 72 inches.  Weight stated at 198 pounds.      Physical Exam  Neurological:      Mental Status: He is alert.   Psychiatric:         Attention and Perception: Perception normal. He is inattentive. He does not perceive auditory or visual hallucinations.         Mood and Affect: Mood normal.         Speech: Speech normal.         Behavior: Behavior normal. Behavior is cooperative.         Thought Content: Thought content normal. Thought content is not paranoid or delusional.  Thought content does not include homicidal or suicidal ideation. Thought content does not include homicidal or suicidal plan.         Cognition and Memory: Cognition and memory normal.      Comments: Restricted affect. Distracted           Mental Status Exam:   Hygiene:   good  Cooperation:  Cooperative  Eye Contact:  Good  Psychomotor Behavior:  Appropriate  Affect:  Restricted  Mood: normal  Speech:  Normal  Thought Process:  Goal directed  Thought Content:  Normal  Suicidal:  None  Homicidal:  None  Hallucinations:  None  Delusion:  None  Memory:  Intact  Orientation:  Person, Place, Time and Situation  Reliability:  good  Insight:  Fair  Judgement:  Fair  Impulse Control:  Fair  Physical/Medical Issues:  No        PHQ-9 Depression Screening  Little interest or pleasure in doing things? 1   Feeling down, depressed, or hopeless? 0   Trouble falling or staying asleep, or sleeping too much? 1   Feeling tired or having little energy? 1   Poor appetite or overeating? 1   Feeling bad about yourself - or that you are a failure or have let yourself or your family down? 0   Trouble concentrating on things, such as reading the newspaper or watching television? 3   Moving or speaking so slowly that other people could have noticed? Or the opposite - being so fidgety or restless that you have been moving around a lot more than usual? 2   Thoughts that you would be better off dead, or of hurting yourself in some way? 0   PHQ-9 Total Score 9   If you checked off any problems, how difficult have these problems made it for you to do your work, take care of things at home, or get along with other people? Very difficult     PHQ-9 Total Score: 9        REMI 7 anxiety screening tool that patient filled out virtually reviewed by this APRN at today's encounter.    PROMIS scale screening tool that patient filled out virtually reviewed by this APRN at today's encounter.    Previous Provider notes and available records reviewed by this  APRN at today's encounter.         Lab Results:   Admission on 12/13/2021, Discharged on 12/13/2021   Component Date Value Ref Range Status   • QT Interval 12/13/2021 334  ms Final   • QTC Interval 12/13/2021 406  ms Final   • Extra Tube 12/13/2021 Hold for add-ons.   Final    Auto resulted.   • Extra Tube 12/13/2021 hold for add-on   Final    Auto resulted   • Extra Tube 12/13/2021 Hold for add-ons.   Final    Auto resulted.   • Extra Tube 12/13/2021 Hold for add-ons.   Final    Auto resulted.   • Extra Tube 12/13/2021 hold for add-on   Final    Auto resulted   • Glucose 12/13/2021 99  65 - 99 mg/dL Final   • BUN 12/13/2021 16  6 - 20 mg/dL Final   • Creatinine 12/13/2021 0.89  0.76 - 1.27 mg/dL Final   • Sodium 12/13/2021 138  136 - 145 mmol/L Final   • Potassium 12/13/2021 4.2  3.5 - 5.2 mmol/L Final    Specimen hemolyzed.  Results may be affected.   • Chloride 12/13/2021 99  98 - 107 mmol/L Final   • CO2 12/13/2021 26.0  22.0 - 29.0 mmol/L Final   • Calcium 12/13/2021 9.6  8.6 - 10.5 mg/dL Final   • Total Protein 12/13/2021 7.6  6.0 - 8.5 g/dL Final   • Albumin 12/13/2021 4.70  3.50 - 5.20 g/dL Final   • ALT (SGPT) 12/13/2021 25  1 - 41 U/L Final    Specimen hemolyzed.  Results may be affected.   • AST (SGOT) 12/13/2021 28  1 - 40 U/L Final   • Alkaline Phosphatase 12/13/2021 92  39 - 117 U/L Final   • Total Bilirubin 12/13/2021 0.3  0.0 - 1.2 mg/dL Final   • eGFR Non African Amer 12/13/2021 106  >60 mL/min/1.73 Final   • Globulin 12/13/2021 2.9  gm/dL Final    Calculated Result   • A/G Ratio 12/13/2021 1.6  g/dL Final   • BUN/Creatinine Ratio 12/13/2021 18.0  7.0 - 25.0 Final   • Anion Gap 12/13/2021 13.0  5.0 - 15.0 mmol/L Final   • Free T4 12/13/2021 1.40  0.93 - 1.70 ng/dL Final   • TSH 12/13/2021 2.730  0.270 - 4.200 uIU/mL Final   • Magnesium 12/13/2021 1.8  1.6 - 2.6 mg/dL Final   • WBC 12/13/2021 6.42  3.40 - 10.80 10*3/mm3 Final   • RBC 12/13/2021 5.49  4.14 - 5.80 10*6/mm3 Final   • Hemoglobin  12/13/2021 15.3  13.0 - 17.7 g/dL Final   • Hematocrit 12/13/2021 45.2  37.5 - 51.0 % Final   • MCV 12/13/2021 82.3  79.0 - 97.0 fL Final   • MCH 12/13/2021 27.9  26.6 - 33.0 pg Final   • MCHC 12/13/2021 33.8  31.5 - 35.7 g/dL Final   • RDW 12/13/2021 12.5  12.3 - 15.4 % Final   • RDW-SD 12/13/2021 37.2  37.0 - 54.0 fl Final   • MPV 12/13/2021 10.6  6.0 - 12.0 fL Final   • Platelets 12/13/2021 250  140 - 450 10*3/mm3 Final   • Neutrophil % 12/13/2021 67.1  42.7 - 76.0 % Final   • Lymphocyte % 12/13/2021 23.2  19.6 - 45.3 % Final   • Monocyte % 12/13/2021 8.1  5.0 - 12.0 % Final   • Eosinophil % 12/13/2021 0.8  0.3 - 6.2 % Final   • Basophil % 12/13/2021 0.2  0.0 - 1.5 % Final   • Immature Grans % 12/13/2021 0.6* 0.0 - 0.5 % Final   • Neutrophils, Absolute 12/13/2021 4.31  1.70 - 7.00 10*3/mm3 Final   • Lymphocytes, Absolute 12/13/2021 1.49  0.70 - 3.10 10*3/mm3 Final   • Monocytes, Absolute 12/13/2021 0.52  0.10 - 0.90 10*3/mm3 Final   • Eosinophils, Absolute 12/13/2021 0.05  0.00 - 0.40 10*3/mm3 Final   • Basophils, Absolute 12/13/2021 0.01  0.00 - 0.20 10*3/mm3 Final   • Immature Grans, Absolute 12/13/2021 0.04  0.00 - 0.05 10*3/mm3 Final   • nRBC 12/13/2021 0.0  0.0 - 0.2 /100 WBC Final         Assessment/Plan   Problems Addressed this Visit     None      Visit Diagnoses     Attention deficit hyperactivity disorder, combined type  (Chronic)   -  Primary    Relevant Medications    Vortioxetine HBr 5 MG tablet    Major depressive disorder, recurrent episode, mild with anxious distress (HCC)  (Chronic)       Relevant Medications    Vortioxetine HBr 5 MG tablet      Diagnoses       Codes Comments    Attention deficit hyperactivity disorder, combined type    -  Primary ICD-10-CM: F90.2  ICD-9-CM: 314.01     Major depressive disorder, recurrent episode, mild with anxious distress (HCC)     ICD-10-CM: F33.0  ICD-9-CM: 296.31           Visit Diagnoses:    ICD-10-CM ICD-9-CM   1. Attention deficit hyperactivity disorder,  combined type  F90.2 314.01   2. Major depressive disorder, recurrent episode, mild with anxious distress ([facility])  F33.0 296.31          GOALS:  Short Term Goals: Patient will be compliant with medication, and patient will have no significant medication related side effects.  Patient will be engaged in psychotherapy as indicated.  Patient will report subjective improvement of symptoms.  Long term goals: To stabilize mood and treat/improve subjective symptoms, the patient will stay out of the hospital, the patient will be at an optimal level of functioning, and the patient will take all medications as prescribed.  The patient verbalized understanding and agreement with goals that were mutually set.      TREATMENT PLAN:   Continue supportive psychotherapy efforts and medications as indicated.   -Continue hydroxyzine 10-20 mg PO TID PRN for anxiety from PCP. This APRN will take over prescription when pt needs a refill  -Discontinue Wellbutrin XL  -Start Trintellix 5 mg PO Daily for depression  -Discontinue Focalin XR due to palpitations    *Will wait to hear about cardiologist appointment before prescribing a stimulant*    Medication and treatment options, both pharmacological and non-pharmacological treatment options, discussed during today's visit, including any off label use of medication. Patient acknowledged and verbally consented with current treatment plan and was educated on the importance of compliance with treatment and follow-up appointments.        MEDICATION ISSUES:    Discussed treatment plan and medication options of prescribed medication as well as the risks, benefits, any black box warnings, and side effects including potential falls, possible impaired driving, and metabolic adversities among others, including any off label use of medication. Patient is agreeable to call the office with any worsening of symptoms or onset of side effects, or if any concerns or questions arise.  The contact information for  the office is made available to the patient. Patient is agreeable to call 911 or go to the nearest ER should they begin having any SI/HI, or if any urgent concerns arise. No medication side effects or related complaints today.       MEDS ORDERED DURING VISIT:  New Medications Ordered This Visit   Medications   • Vortioxetine HBr 5 MG tablet     Sig: Take 5 mg by mouth Daily.     Dispense:  30 tablet     Refill:  1       Return in about 6 weeks (around 2/3/2022).     Treatment plan completed: 10/28/21    Progress toward goal: Not at goal    Functional Status: Moderate impairment     Prognosis: Good with Ongoing Treatment         This document has been electronically signed by MITCHEL Billings  December 23, 2021 15:32 EST     Some of the data in this electronic note has been brought forward from a previous encounter, any necessary changes have been made, it has been reviewed by this APRN, and it is accurate.    Please note that portions of this note were completed with a voice recognition program. Efforts were made to edit dictation, but occasionally words are mistranscribed.

## 2022-01-24 NOTE — PROGRESS NOTES
"[facility]      Mode of Visit: Video  Location of patient: home  You have chosen to receive care through a telehealth visit.  Does the patient consent to use a video/audio connection for your medical care today? Yes  The visit included audio and video interaction. No technical issues occurred during this visit.     Chief Complaint  Follow-up and Palpitations    History of Present Illness    Gabriele Miguel is a 21 y.o. male with ADHD, anxiety/depression and asthma who presents today as a telemedicine follow up for palpitations.  Patient was placed in an extended Holter monitor which was negative for arrhythmias.  Triggered events were primarily PVCs which were less than 0.01%. He continues to have some intermittent palpitations. No associated dizziness/lightheadedness, shortness of breath or chest pain. He has been cutting back his caffeine. No exertional symptoms. He reports that he has been intermittently checking his blood pressure, but not often since starting a new job. He thinks the average is 130/75. He reports his dad and his sister have HTN      Objective     Vital Signs:   Vitals:    01/25/22 1453   Weight: 86.2 kg (190 lb)   Height: 185.4 cm (73\")     Body mass index is 25.07 kg/m².    Virtual Visit Physical Exam  Physical Exam  Constitutional:       Appearance: Normal appearance.   HENT:      Head: Normocephalic.   Pulmonary:      Effort: Pulmonary effort is normal. No respiratory distress.   Neurological:      Mental Status: He is alert and oriented to person, place, and time.   Psychiatric:         Mood and Affect: Mood normal.         Behavior: Behavior normal.         Thought Content: Thought content normal.              Result Review  Data Reviewed:{ Labs  Result Review  Imaging  Med Tab  Media :23}   Extended Holter monitor 12/2021 average heart rate 87, minimum rate 49 and maximum heart of 182 bpm.  There were rare PVCs and PACs.  Triggered events " were primarily PVCs. No arrhythmias noted             Assessment and Plan {CC Problem List  Visit Diagnosis  ROS  Review (Popup)  Health Maintenance  Quality  BestPractice  Medications  SmartSets  SnapShot Encounters  Media :23}   1. Palpitations  Symptomatic PVCs noted on Holter, however, he only had nine PVCs in the entire 14 days of data.  Reassured him that PVCs are benign.  Encouraged him to limit caffeine intake.  Encouraged regular exercise, healthy diet with lots of fruits and vegetables high in magnesium and potassium.  Discussed that PVCs can also be triggered by anxiety and stress and encouraged regular exercise to help with this.     2. Elevated blood-pressure reading, without diagnosis of hypertension  He sees his PCP on 2/11.  Encouraged him to keep a log of his blood pressures and to bring to the next visit.  Discussed goal blood pressure of 120/80.            Follow Up {Instructions Charge Capture  Follow-up Communications :23}   No follow-ups on file.    Patient was given instructions and counseling regarding his condition or for health maintenance advice. Please see specific information pulled into the AVS if appropriate.  Advised to call the [facility] with any questions, concerns, or worsening symptoms.    Dictated Utilizing Dragon Dictation

## 2022-01-25 ENCOUNTER — TELEMEDICINE (OUTPATIENT)
Dept: CARDIOLOGY | Facility: HOSPITAL | Age: 22
End: 2022-01-25

## 2022-01-25 VITALS — BODY MASS INDEX: 25.18 KG/M2 | WEIGHT: 190 LBS | HEIGHT: 73 IN

## 2022-01-25 DIAGNOSIS — R03.0 ELEVATED BLOOD-PRESSURE READING, WITHOUT DIAGNOSIS OF HYPERTENSION: ICD-10-CM

## 2022-01-25 DIAGNOSIS — R00.2 PALPITATIONS: Primary | ICD-10-CM

## 2022-01-25 PROCEDURE — 99213 OFFICE O/P EST LOW 20 MIN: CPT | Performed by: NURSE PRACTITIONER

## 2022-01-31 PROCEDURE — 93248 EXT ECG>7D<15D REV&INTERPJ: CPT | Performed by: INTERNAL MEDICINE

## 2022-02-02 ENCOUNTER — TELEPHONE (OUTPATIENT)
Dept: INTERNAL MEDICINE | Facility: CLINIC | Age: 22
End: 2022-02-02

## 2022-02-11 ENCOUNTER — OFFICE VISIT (OUTPATIENT)
Dept: INTERNAL MEDICINE | Facility: CLINIC | Age: 22
End: 2022-02-11

## 2022-02-11 VITALS
BODY MASS INDEX: 25.71 KG/M2 | SYSTOLIC BLOOD PRESSURE: 152 MMHG | DIASTOLIC BLOOD PRESSURE: 80 MMHG | HEIGHT: 73 IN | TEMPERATURE: 97.9 F | RESPIRATION RATE: 16 BRPM | WEIGHT: 194 LBS | HEART RATE: 100 BPM | OXYGEN SATURATION: 96 %

## 2022-02-11 DIAGNOSIS — I10 PRIMARY HYPERTENSION: ICD-10-CM

## 2022-02-11 DIAGNOSIS — Z11.59 ENCOUNTER FOR HEPATITIS C SCREENING TEST FOR LOW RISK PATIENT: ICD-10-CM

## 2022-02-11 DIAGNOSIS — Z00.00 ANNUAL PHYSICAL EXAM: Primary | ICD-10-CM

## 2022-02-11 LAB
BILIRUB BLD-MCNC: NEGATIVE MG/DL
CLARITY, POC: CLEAR
COLOR UR: YELLOW
EXPIRATION DATE: NORMAL
GLUCOSE UR STRIP-MCNC: NEGATIVE MG/DL
KETONES UR QL: NEGATIVE
LEUKOCYTE EST, POC: NEGATIVE
Lab: NORMAL
NITRITE UR-MCNC: NEGATIVE MG/ML
PH UR: 7 [PH] (ref 5–8)
PROT UR STRIP-MCNC: NEGATIVE MG/DL
RBC # UR STRIP: NEGATIVE /UL
SP GR UR: 1.02 (ref 1–1.03)
UROBILINOGEN UR QL: NORMAL

## 2022-02-11 PROCEDURE — 99395 PREV VISIT EST AGE 18-39: CPT | Performed by: NURSE PRACTITIONER

## 2022-02-11 PROCEDURE — 81003 URINALYSIS AUTO W/O SCOPE: CPT | Performed by: NURSE PRACTITIONER

## 2022-02-11 RX ORDER — METOPROLOL SUCCINATE 25 MG/1
12.5 TABLET, EXTENDED RELEASE ORAL DAILY
Qty: 30 TABLET | Refills: 2 | Status: SHIPPED | OUTPATIENT
Start: 2022-02-11

## 2022-02-11 NOTE — PROGRESS NOTES
Subjective   Gabriele Miguel is a 22 y.o. male    Chief Complaint   Patient presents with   • Annual Exam     History of Present Illness     Here for annual exam    COVID -declines, states he will get this later at a pharmacy  Flu shot -declines  Tdap -2016  Hep C screen -ordered    Diet -generally healthy    Exercise -moderately active    Social History     Tobacco Use   • Smoking status: Never Smoker   • Smokeless tobacco: Never Used   Vaping Use   • Vaping Use: Every day   • Start date: 6/1/2019   • Substances: Nicotine, Flavoring   • Devices: [employer] tank   Substance Use Topics   • Alcohol use: Yes     Alcohol/week: 5.0 standard drinks     Types: 5 Shots of liquor per week     Comment: socially mixed drink   • Drug use: Yes     Types: Marijuana     Comment: Rarely, once in the 6 months         The following portions of the patient's history were reviewed and updated as appropriate: allergies, current medications, past family history, past medical history, past social history, past surgical history and problem list.    Current Outpatient Medications:   •  albuterol (PROVENTIL HFA;VENTOLIN HFA) 108 (90 BASE) MCG/ACT inhaler, Inhale 2 puffs every 4 (four) hours as needed for wheezing., Disp: , Rfl:   •  hydrOXYzine (ATARAX) 10 MG tablet, 1-2 PO TID PRN for anxiety, Disp: 90 tablet, Rfl: 1  •  Vortioxetine HBr 5 MG tablet, Take 5 mg by mouth Daily., Disp: 30 tablet, Rfl: 1  •  metoprolol succinate XL (Toprol XL) 25 MG 24 hr tablet, Take 0.5 tablets by mouth Daily., Disp: 30 tablet, Rfl: 2     Review of Systems   Constitutional: Negative for appetite change, chills, fatigue, fever and unexpected weight change.   HENT: Negative for congestion, ear pain, nosebleeds, rhinorrhea and tinnitus.    Eyes: Negative for pain.   Respiratory: Negative for cough, chest tightness and shortness of breath.    Cardiovascular: Negative for chest pain, palpitations and leg swelling.   Gastrointestinal: Negative for abdominal  distention, abdominal pain, blood in stool, constipation, diarrhea, nausea and vomiting.   Endocrine: Negative for cold intolerance, heat intolerance, polydipsia, polyphagia and polyuria.   Genitourinary: Negative for dysuria, flank pain, frequency, hematuria and urgency.   Musculoskeletal: Negative for arthralgias, back pain, gait problem, joint swelling, myalgias and neck pain.   Skin: Negative for color change, pallor, rash and wound.   Allergic/Immunologic: Negative for environmental allergies and food allergies.   Neurological: Negative for dizziness, syncope, weakness, light-headedness, numbness and headaches.   Hematological: Negative for adenopathy. Does not bruise/bleed easily.   Psychiatric/Behavioral: Negative for behavioral problems and suicidal ideas. The patient is not nervous/anxious.        Objective   Physical Exam  Constitutional:       Appearance: He is well-developed and normal weight.   HENT:      Head: Normocephalic and atraumatic.      Right Ear: External ear normal.      Left Ear: External ear normal.      Nose: Nose normal.      Mouth/Throat:      Mouth: Mucous membranes are moist.      Pharynx: Oropharynx is clear.   Eyes:      General: Lids are normal.      Conjunctiva/sclera: Conjunctivae normal.      Pupils: Pupils are equal, round, and reactive to light.   Neck:      Vascular: No carotid bruit.   Cardiovascular:      Rate and Rhythm: Normal rate and regular rhythm.      Pulses: Normal pulses.      Heart sounds: Normal heart sounds. No murmur heard.      Pulmonary:      Effort: Pulmonary effort is normal.      Breath sounds: Normal breath sounds.   Abdominal:      General: Bowel sounds are normal.      Palpations: Abdomen is soft. There is no hepatomegaly or splenomegaly.      Hernia: No hernia is present.   Genitourinary:     Penis: Normal.       Prostate: Normal.      Rectum: Normal. Guaiac result negative.   Musculoskeletal:         General: Normal range of motion.      Cervical back:  "Normal range of motion and neck supple.   Lymphadenopathy:      Cervical: No cervical adenopathy.   Skin:     General: Skin is warm and dry.      Capillary Refill: Capillary refill takes less than 2 seconds.   Neurological:      Mental Status: He is alert and oriented to person, place, and time.      Deep Tendon Reflexes: Reflexes are normal and symmetric.   Psychiatric:         Mood and Affect: Mood normal.         Behavior: Behavior normal.         Thought Content: Thought content normal.         Judgment: Judgment normal.       Vitals:    02/11/22 1546   BP: 152/80   Cuff Size: Adult   Pulse: 100   Resp: 16   Temp: 97.9 °F (36.6 °C)   TempSrc: Infrared   SpO2: 96%   Weight: 88 kg (194 lb)   Height: 185.4 cm (73\")         Assessment/Plan   Diagnoses and all orders for this visit:    1. Annual physical exam (Primary)  -     POCT urinalysis dipstick, automated  -     Comprehensive Metabolic Panel; Future  -     Lipid Panel; Future    2. Primary hypertension  -     metoprolol succinate XL (Toprol XL) 25 MG 24 hr tablet; Take 0.5 tablets by mouth Daily.  Dispense: 30 tablet; Refill: 2  -     Comprehensive Metabolic Panel; Future  -     Lipid Panel; Future    3. Encounter for hepatitis C screening test for low risk patient  -     Hepatitis C Antibody; Future      Blood pressure has been running high for quite some time, will start metoprolol XL 25 mg 1/2 tablet p.o. daily.  Labs sent  Counseling-diet and exercise  Return in about 4 weeks (around 3/11/2022).             "

## 2023-05-16 ENCOUNTER — OFFICE VISIT (OUTPATIENT)
Dept: INTERNAL MEDICINE | Facility: CLINIC | Age: 23
End: 2023-05-16
Payer: COMMERCIAL

## 2023-05-16 VITALS
BODY MASS INDEX: 26.39 KG/M2 | HEART RATE: 97 BPM | SYSTOLIC BLOOD PRESSURE: 104 MMHG | DIASTOLIC BLOOD PRESSURE: 78 MMHG | TEMPERATURE: 98.2 F | HEIGHT: 73 IN | RESPIRATION RATE: 20 BRPM | OXYGEN SATURATION: 98 %

## 2023-05-16 DIAGNOSIS — J02.9 PHARYNGITIS, UNSPECIFIED ETIOLOGY: Primary | ICD-10-CM

## 2023-05-16 DIAGNOSIS — J02.9 SORE THROAT: ICD-10-CM

## 2023-05-16 LAB
EXPIRATION DATE: NORMAL
EXPIRATION DATE: NORMAL
FLUAV AG UPPER RESP QL IA.RAPID: NOT DETECTED
FLUBV AG UPPER RESP QL IA.RAPID: NOT DETECTED
INTERNAL CONTROL: NORMAL
INTERNAL CONTROL: NORMAL
Lab: NORMAL
Lab: NORMAL
S PYO AG THROAT QL: NEGATIVE
SARS-COV-2 AG UPPER RESP QL IA.RAPID: NOT DETECTED

## 2023-05-16 PROCEDURE — 87428 SARSCOV & INF VIR A&B AG IA: CPT | Performed by: PHYSICIAN ASSISTANT

## 2023-05-16 PROCEDURE — 87880 STREP A ASSAY W/OPTIC: CPT | Performed by: PHYSICIAN ASSISTANT

## 2023-05-16 PROCEDURE — 99213 OFFICE O/P EST LOW 20 MIN: CPT | Performed by: PHYSICIAN ASSISTANT

## 2023-05-16 RX ORDER — AZITHROMYCIN 250 MG/1
TABLET, FILM COATED ORAL
Qty: 6 TABLET | Refills: 0 | Status: SHIPPED | OUTPATIENT
Start: 2023-05-16

## 2023-05-16 RX ORDER — DEXTROAMPHETAMINE SACCHARATE, AMPHETAMINE ASPARTATE, DEXTROAMPHETAMINE SULFATE AND AMPHETAMINE SULFATE 5; 5; 5; 5 MG/1; MG/1; MG/1; MG/1
TABLET ORAL
COMMUNITY
Start: 2023-04-12

## 2023-05-16 NOTE — ASSESSMENT & PLAN NOTE
Treat with zpack. Throat spray prn.   Supportive care, stay hydrated, rest.  Follow up if symptoms worsen or persist. Monitor for new symptoms. Go to the ER for respiratory distress, dehydration, or severe symptoms.

## 2023-05-16 NOTE — PROGRESS NOTES
Chief Complaint  Sore Throat and Fever    Subjective          History of Present Illness  Gabriele Miguel presents to [facility] for   History of Present Illness  Sore Throat:  Sx started a week ago with sore throat, he now has a lot of congestion. Has had some nausea but feels like it is from drainage down his throat. He does not get sinus infections very often, normally does not have allergy trouble this time of year. No sick contacts. Has not had wheezing. Has had some body aches, headache, and felt like his lymph nodes are bothering him.       The following portions of the patient's history were reviewed and updated as appropriate: allergies, current medications, past family history, past medical history, past social history, past surgical history and problem list.  Allergies   Allergen Reactions   • Dexmethylphenidate Other (See Comments)     Racing heart        Current Outpatient Medications:   •  amphetamine-dextroamphetamine (ADDERALL) 20 MG tablet, , Disp: , Rfl:   •  metoprolol succinate XL (Toprol XL) 25 MG 24 hr tablet, Take 0.5 tablets by mouth Daily., Disp: 30 tablet, Rfl: 2  •  albuterol (PROVENTIL HFA;VENTOLIN HFA) 108 (90 BASE) MCG/ACT inhaler, Inhale 2 puffs every 4 (four) hours as needed for wheezing. (Patient not taking: Reported on 5/16/2023), Disp: , Rfl:   •  azithromycin (Zithromax Z-Gus) 250 MG tablet, Take 2 tablets by mouth on day 1, then 1 tablet daily on days 2-5, Disp: 6 tablet, Rfl: 0  •  hydrOXYzine (ATARAX) 10 MG tablet, 1-2 PO TID PRN for anxiety (Patient not taking: Reported on 5/16/2023), Disp: 90 tablet, Rfl: 1  •  Vortioxetine HBr 5 MG tablet, Take 5 mg by mouth Daily., Disp: 30 tablet, Rfl: 1  New Medications Ordered This Visit   Medications   • azithromycin (Zithromax Z-Gus) 250 MG tablet     Sig: Take 2 tablets by mouth on day 1, then 1 tablet daily on days 2-5     Dispense:  6 tablet     Refill:  0     Social History     Tobacco Use   Smoking  "Status Never   Smokeless Tobacco Never        Objective   Vital Signs:   Vitals:    05/16/23 0831   BP: 104/78   Pulse: 97   Resp: 20   Temp: 98.2 °F (36.8 °C)   SpO2: 98%   Height: 185.4 cm (73\")   PainSc:   4   PainLoc: Throat      Body mass index is 26.39 kg/m².  Physical Exam  Vitals reviewed.   Constitutional:       General: He is not in acute distress.     Appearance: Normal appearance.   HENT:      Head: Normocephalic and atraumatic.      Right Ear: Tympanic membrane, ear canal and external ear normal.      Left Ear: Tympanic membrane, ear canal and external ear normal.      Nose: Nose normal.      Mouth/Throat:      Mouth: Mucous membranes are moist.      Pharynx: Posterior oropharyngeal erythema present. No oropharyngeal exudate.   Eyes:      General: No scleral icterus.     Extraocular Movements: Extraocular movements intact.      Conjunctiva/sclera: Conjunctivae normal.   Cardiovascular:      Rate and Rhythm: Normal rate and regular rhythm.      Heart sounds: Normal heart sounds. No murmur heard.  Pulmonary:      Effort: Pulmonary effort is normal. No respiratory distress.      Breath sounds: Normal breath sounds. No stridor. No wheezing or rhonchi.   Musculoskeletal:         General: Normal range of motion.      Cervical back: Normal range of motion and neck supple.   Lymphadenopathy:      Cervical: Cervical adenopathy present.   Skin:     General: Skin is warm and dry.      Coloration: Skin is not jaundiced.   Neurological:      General: No focal deficit present.      Mental Status: He is alert and oriented to person, place, and time.      Gait: Gait normal.   Psychiatric:         Mood and Affect: Mood normal.         Behavior: Behavior normal.        No LMP for male patient.    Result Review :              Results for orders placed or performed in visit on 05/16/23   POC Rapid Strep A    Specimen: Swab   Result Value Ref Range    Rapid Strep A Screen Negative Negative, VALID, INVALID, Not Performed    " Internal Control Passed Passed    Lot Number [id number]     Expiration Date 12/16/2025    POCT SARS-CoV-2 Antigen TAMELA + Flu    Specimen: Swab   Result Value Ref Range    SARS Antigen Not Detected Not Detected, Presumptive Negative    Influenza A Antigen TAMELA Not Detected Not Detected    Influenza B Antigen TAMELA Not Detected Not Detected    Internal Control Passed Passed    Lot Number [id number]     Expiration Date 3/23/2024           Assessment and Plan    Diagnoses and all orders for this visit:    1. Pharyngitis, unspecified etiology (Primary)  Assessment & Plan:  Treat with zpack. Throat spray prn.   Supportive care, stay hydrated, rest.  Follow up if symptoms worsen or persist. Monitor for new symptoms. Go to the ER for respiratory distress, dehydration, or severe symptoms.      Orders:  -     azithromycin (Zithromax Z-Gus) 250 MG tablet; Take 2 tablets by mouth on day 1, then 1 tablet daily on days 2-5  Dispense: 6 tablet; Refill: 0    2. Sore throat  -     POC Rapid Strep A  -     POCT SARS-CoV-2 Antigen TAMELA + Flu      Follow Up   Return if symptoms worsen or fail to improve.    Follow up if symptoms worsen or persist or has new or concerning symptoms, go to ER for severe symptoms.   Reviewed common medication effects and side effects and advised to report side effects immediately.  Encouraged medication compliance and the importance of keeping scheduled follow up appointments with me and any other providers.  If a referral was made please contact our office if you have not heard about an appointment in the next 2 weeks.   If labs or images are ordered we will contact you with the results within the next week.  If you have not heard from us after a week please call our office to inquire about the results.   Patient was given instructions and counseling regarding his condition or for health maintenance advice. Please see specific information pulled into the AVS if appropriate.     Aliza Caraballo PA-C    * Please  note that portions of this note were completed with a voice recognition program.

## 2023-12-04 ENCOUNTER — OFFICE VISIT (OUTPATIENT)
Dept: INTERNAL MEDICINE | Facility: CLINIC | Age: 23
End: 2023-12-04
Payer: COMMERCIAL

## 2023-12-04 VITALS
HEART RATE: 91 BPM | WEIGHT: 192.8 LBS | DIASTOLIC BLOOD PRESSURE: 60 MMHG | BODY MASS INDEX: 25.55 KG/M2 | OXYGEN SATURATION: 97 % | SYSTOLIC BLOOD PRESSURE: 119 MMHG | TEMPERATURE: 96 F | HEIGHT: 73 IN

## 2023-12-04 DIAGNOSIS — N52.9 ERECTILE DYSFUNCTION, UNSPECIFIED ERECTILE DYSFUNCTION TYPE: ICD-10-CM

## 2023-12-04 DIAGNOSIS — N50.811 PAIN IN BOTH TESTICLES: ICD-10-CM

## 2023-12-04 DIAGNOSIS — N50.812 PAIN IN BOTH TESTICLES: ICD-10-CM

## 2023-12-04 DIAGNOSIS — R68.82 DECREASED LIBIDO: Primary | ICD-10-CM

## 2023-12-04 DIAGNOSIS — Z83.3 FAMILY HISTORY OF DIABETES MELLITUS: ICD-10-CM

## 2023-12-04 PROCEDURE — 99214 OFFICE O/P EST MOD 30 MIN: CPT | Performed by: NURSE PRACTITIONER

## 2023-12-04 PROCEDURE — 81003 URINALYSIS AUTO W/O SCOPE: CPT | Performed by: NURSE PRACTITIONER

## 2023-12-04 NOTE — PROGRESS NOTES
Problem: Goal Outcome Summary  Goal: Goal Outcome Summary  PT: met with pt, reports she does not feel as steady on her feet as normal but reports she has been up walking here with the walker, and has a walker to use at home. She walks the halls for exercise at home. She reports no need for PT and reports no concerns about going home today. Will complete order. Encouraged to walk with staff here.       Subjective   Gabriele Miguel is a 23 y.o. male    Chief Complaint   Patient presents with    Decreased libido    testosterone     Patient in clinic with concerns of decrease testosterone      History of Present Illness     Pt presents with c/o a very decreased libido.  States that he is in a very healthy relationship, but he has not desire.  When they do have intercourse, he does have problems with ED as well.  He was concerned about DM due to family hx.  He has checked his FS and it was normal.  He does have occasional pain in his testicles at times as well.  Denies any change in urinary sx's.  No d/c noted.  No sx's or concerns for an STD    The following portions of the patient's history were reviewed and updated as appropriate: allergies, current medications, past family history, past medical history, past social history, past surgical history, and problem list.  No current outpatient medications on file.     Review of Systems   Constitutional:  Negative for chills, fatigue and fever.        Decreased libido     Respiratory:  Negative for cough, chest tightness and shortness of breath.    Cardiovascular:  Negative for chest pain.   Gastrointestinal:  Negative for abdominal pain, diarrhea, nausea and vomiting.   Endocrine: Negative for cold intolerance and heat intolerance.   Genitourinary:  Positive for testicular pain. Negative for decreased urine volume, difficulty urinating, dysuria, enuresis, flank pain, frequency, genital sores, hematuria, penile discharge, penile pain, penile swelling, scrotal swelling and urgency.   Musculoskeletal:  Negative for arthralgias.   Neurological:  Negative for dizziness.       Objective   Physical Exam  Exam conducted with a chaperone present.   Constitutional:       Appearance: He is well-developed.   HENT:      Head: Normocephalic and atraumatic.   Eyes:      Conjunctiva/sclera: Conjunctivae normal.      Pupils: Pupils are equal, round, and reactive to light.  "  Cardiovascular:      Rate and Rhythm: Normal rate and regular rhythm.      Heart sounds: Normal heart sounds.   Pulmonary:      Effort: Pulmonary effort is normal.      Breath sounds: Normal breath sounds.   Abdominal:      General: Bowel sounds are normal.      Palpations: Abdomen is soft.   Genitourinary:     Penis: Normal.       Testes: Normal.   Musculoskeletal:         General: Normal range of motion.      Cervical back: Normal range of motion.   Skin:     General: Skin is warm and dry.   Neurological:      Mental Status: He is alert and oriented to person, place, and time.      Deep Tendon Reflexes: Reflexes are normal and symmetric.   Psychiatric:         Behavior: Behavior normal.         Thought Content: Thought content normal.         Judgment: Judgment normal.       Vitals:    12/04/23 1630   BP: 119/60   Pulse: 91   Temp: 96 °F (35.6 °C)   SpO2: 97%   Weight: 87.5 kg (192 lb 12.8 oz)   Height: 185.4 cm (72.99\")         Assessment & Plan   Diagnoses and all orders for this visit:    1. Decreased libido (Primary)  -     CBC & Differential; Future  -     TSH Rfx On Abnormal To Free T4; Future  -     Comprehensive Metabolic Panel; Future  -     Testosterone; Future  -     Hemoglobin A1c; Future  -     PSA DIAGNOSTIC ONLY; Future  -     POC Urinalysis Dipstick, Automated    2. Erectile dysfunction, unspecified erectile dysfunction type  -     CBC & Differential; Future  -     TSH Rfx On Abnormal To Free T4; Future  -     Comprehensive Metabolic Panel; Future  -     Testosterone; Future  -     Hemoglobin A1c; Future  -     PSA DIAGNOSTIC ONLY; Future  -     POC Urinalysis Dipstick, Automated    3. Pain in both testicles  -     CBC & Differential; Future  -     TSH Rfx On Abnormal To Free T4; Future  -     Comprehensive Metabolic Panel; Future  -     Testosterone; Future  -     Hemoglobin A1c; Future  -     PSA DIAGNOSTIC ONLY; Future  -     POC Urinalysis Dipstick, Automated  -     US scrotum and testicles; " Future    4. Family history of diabetes mellitus  -     CBC & Differential; Future  -     TSH Rfx On Abnormal To Free T4; Future  -     Comprehensive Metabolic Panel; Future  -     Testosterone; Future  -     Hemoglobin A1c; Future      UA is clear  Labs sent  Will ck US of the scrotum  May need to see Urology, will await results  Return for Annual.

## 2023-12-05 ENCOUNTER — LAB (OUTPATIENT)
Dept: INTERNAL MEDICINE | Facility: CLINIC | Age: 23
End: 2023-12-05
Payer: COMMERCIAL

## 2023-12-05 DIAGNOSIS — R68.82 DECREASED LIBIDO: ICD-10-CM

## 2023-12-05 DIAGNOSIS — Z83.3 FAMILY HISTORY OF DIABETES MELLITUS: ICD-10-CM

## 2023-12-05 DIAGNOSIS — N52.9 ERECTILE DYSFUNCTION, UNSPECIFIED ERECTILE DYSFUNCTION TYPE: ICD-10-CM

## 2023-12-05 DIAGNOSIS — N50.811 PAIN IN BOTH TESTICLES: ICD-10-CM

## 2023-12-05 DIAGNOSIS — N50.812 PAIN IN BOTH TESTICLES: ICD-10-CM

## 2023-12-05 LAB
ALBUMIN SERPL-MCNC: 4.5 G/DL (ref 3.5–5.2)
ALBUMIN/GLOB SERPL: 1.9 G/DL
ALP SERPL-CCNC: 86 U/L (ref 39–117)
ALT SERPL W P-5'-P-CCNC: 34 U/L (ref 1–41)
ANION GAP SERPL CALCULATED.3IONS-SCNC: 10.7 MMOL/L (ref 5–15)
AST SERPL-CCNC: 40 U/L (ref 1–40)
BASOPHILS # BLD AUTO: 0.02 10*3/MM3 (ref 0–0.2)
BASOPHILS NFR BLD AUTO: 0.4 % (ref 0–1.5)
BILIRUB BLD-MCNC: NEGATIVE MG/DL
BILIRUB SERPL-MCNC: 0.4 MG/DL (ref 0–1.2)
BUN SERPL-MCNC: 16 MG/DL (ref 6–20)
BUN/CREAT SERPL: 17 (ref 7–25)
CALCIUM SPEC-SCNC: 9.5 MG/DL (ref 8.6–10.5)
CHLORIDE SERPL-SCNC: 103 MMOL/L (ref 98–107)
CLARITY, POC: CLEAR
CO2 SERPL-SCNC: 27.3 MMOL/L (ref 22–29)
COLOR UR: YELLOW
CREAT SERPL-MCNC: 0.94 MG/DL (ref 0.76–1.27)
DEPRECATED RDW RBC AUTO: 37.5 FL (ref 37–54)
EGFRCR SERPLBLD CKD-EPI 2021: 115.4 ML/MIN/1.73
EOSINOPHIL # BLD AUTO: 0.13 10*3/MM3 (ref 0–0.4)
EOSINOPHIL NFR BLD AUTO: 2.5 % (ref 0.3–6.2)
ERYTHROCYTE [DISTWIDTH] IN BLOOD BY AUTOMATED COUNT: 12.7 % (ref 12.3–15.4)
EXPIRATION DATE: NORMAL
GLOBULIN UR ELPH-MCNC: 2.4 GM/DL
GLUCOSE SERPL-MCNC: 113 MG/DL (ref 65–99)
GLUCOSE UR STRIP-MCNC: NEGATIVE MG/DL
HBA1C MFR BLD: 5.2 % (ref 4.8–5.6)
HCT VFR BLD AUTO: 44.8 % (ref 37.5–51)
HGB BLD-MCNC: 15.2 G/DL (ref 13–17.7)
IMM GRANULOCYTES # BLD AUTO: 0.04 10*3/MM3 (ref 0–0.05)
IMM GRANULOCYTES NFR BLD AUTO: 0.8 % (ref 0–0.5)
KETONES UR QL: NEGATIVE
LEUKOCYTE EST, POC: NEGATIVE
LYMPHOCYTES # BLD AUTO: 1.75 10*3/MM3 (ref 0.7–3.1)
LYMPHOCYTES NFR BLD AUTO: 34 % (ref 19.6–45.3)
Lab: NORMAL
MCH RBC QN AUTO: 27.8 PG (ref 26.6–33)
MCHC RBC AUTO-ENTMCNC: 33.9 G/DL (ref 31.5–35.7)
MCV RBC AUTO: 81.9 FL (ref 79–97)
MONOCYTES # BLD AUTO: 0.43 10*3/MM3 (ref 0.1–0.9)
MONOCYTES NFR BLD AUTO: 8.3 % (ref 5–12)
NEUTROPHILS NFR BLD AUTO: 2.78 10*3/MM3 (ref 1.7–7)
NEUTROPHILS NFR BLD AUTO: 54 % (ref 42.7–76)
NITRITE UR-MCNC: NEGATIVE MG/ML
NRBC BLD AUTO-RTO: 0 /100 WBC (ref 0–0.2)
PH UR: 6 [PH] (ref 5–8)
PLATELET # BLD AUTO: 217 10*3/MM3 (ref 140–450)
PMV BLD AUTO: 10.1 FL (ref 6–12)
POTASSIUM SERPL-SCNC: 4.5 MMOL/L (ref 3.5–5.2)
PROT SERPL-MCNC: 6.9 G/DL (ref 6–8.5)
PROT UR STRIP-MCNC: NEGATIVE MG/DL
PSA SERPL-MCNC: 1.59 NG/ML (ref 0–4)
RBC # BLD AUTO: 5.47 10*6/MM3 (ref 4.14–5.8)
RBC # UR STRIP: NEGATIVE /UL
SODIUM SERPL-SCNC: 141 MMOL/L (ref 136–145)
SP GR UR: 1.01 (ref 1–1.03)
TESTOST SERPL-MCNC: 285 NG/DL (ref 249–836)
TSH SERPL DL<=0.05 MIU/L-ACNC: 2.23 UIU/ML (ref 0.27–4.2)
UROBILINOGEN UR QL: NORMAL
WBC NRBC COR # BLD AUTO: 5.15 10*3/MM3 (ref 3.4–10.8)

## 2023-12-05 PROCEDURE — 84153 ASSAY OF PSA TOTAL: CPT | Performed by: NURSE PRACTITIONER

## 2023-12-05 PROCEDURE — 84403 ASSAY OF TOTAL TESTOSTERONE: CPT | Performed by: NURSE PRACTITIONER

## 2023-12-05 PROCEDURE — 36415 COLL VENOUS BLD VENIPUNCTURE: CPT | Performed by: NURSE PRACTITIONER

## 2023-12-05 PROCEDURE — 83036 HEMOGLOBIN GLYCOSYLATED A1C: CPT | Performed by: NURSE PRACTITIONER

## 2023-12-05 PROCEDURE — 80050 GENERAL HEALTH PANEL: CPT | Performed by: NURSE PRACTITIONER

## 2023-12-11 ENCOUNTER — TELEPHONE (OUTPATIENT)
Dept: INTERNAL MEDICINE | Facility: CLINIC | Age: 23
End: 2023-12-11
Payer: COMMERCIAL

## 2023-12-11 DIAGNOSIS — N50.811 PAIN IN BOTH TESTICLES: ICD-10-CM

## 2023-12-11 DIAGNOSIS — N50.812 PAIN IN BOTH TESTICLES: ICD-10-CM

## 2023-12-11 DIAGNOSIS — N52.9 ERECTILE DYSFUNCTION, UNSPECIFIED ERECTILE DYSFUNCTION TYPE: Primary | ICD-10-CM

## 2023-12-11 NOTE — TELEPHONE ENCOUNTER
Name: Gabriele Miguel      Relationship: Self      Best Callback Number: [phone]       HUB PROVIDED THE RELAY MESSAGE FROM THE OFFICE      PATIENT: VOICED UNDERSTANDING AND HAS NO FURTHER QUESTIONS AT THIS TIME    ADDITIONAL INFORMATION:

## 2023-12-11 NOTE — TELEPHONE ENCOUNTER
----- Message from MITCHEL Guardado sent at 12/11/2023 12:23 PM EST -----  Glucose is mildly elevated, but A1c is well within normal limits.  Not suspicious for diabetes.  Thyroid labs are normal.  PSA is normal.  Testosterone is on the low end of normal.  But still within normal range.  I would like to refer him onto urology for further evaluation of symptoms.  Referral has been entered.

## 2023-12-11 NOTE — TELEPHONE ENCOUNTER
L/M HUB TO RELAY   Glucose is mildly elevated, but A1c is well within normal limits.  Not suspicious for diabetes.  Thyroid labs are normal.  PSA is normal.  Testosterone is on the low end of normal.  But still within normal range.  I would like to refer him onto urology for further evaluation of symptoms.  Referral has been entered.     [employer] message also sent

## 2023-12-21 ENCOUNTER — TELEPHONE (OUTPATIENT)
Dept: INTERNAL MEDICINE | Facility: CLINIC | Age: 23
End: 2023-12-21
Payer: COMMERCIAL

## 2023-12-21 NOTE — TELEPHONE ENCOUNTER
Letter mailed to patient with results  Glucose is mildly elevated, but A1c is well within normal limits.  Not suspicious for diabetes.  Thyroid labs are normal.  PSA is normal.  Testosterone is on the low end of normal.  But still within normal range.  I would like to refer him onto urology for further evaluation of symptoms.  Referral has been entered.

## 2024-01-09 ENCOUNTER — OFFICE VISIT (OUTPATIENT)
Dept: INTERNAL MEDICINE | Facility: CLINIC | Age: 24
End: 2024-01-09
Payer: COMMERCIAL

## 2024-01-09 ENCOUNTER — LAB (OUTPATIENT)
Dept: INTERNAL MEDICINE | Facility: CLINIC | Age: 24
End: 2024-01-09
Payer: COMMERCIAL

## 2024-01-09 VITALS
TEMPERATURE: 97.5 F | SYSTOLIC BLOOD PRESSURE: 140 MMHG | OXYGEN SATURATION: 98 % | BODY MASS INDEX: 25.95 KG/M2 | HEIGHT: 73 IN | WEIGHT: 195.8 LBS | DIASTOLIC BLOOD PRESSURE: 80 MMHG | HEART RATE: 69 BPM

## 2024-01-09 DIAGNOSIS — R53.83 OTHER FATIGUE: ICD-10-CM

## 2024-01-09 DIAGNOSIS — Z11.59 ENCOUNTER FOR HEPATITIS C SCREENING TEST FOR LOW RISK PATIENT: ICD-10-CM

## 2024-01-09 DIAGNOSIS — E55.9 VITAMIN D DEFICIENCY: ICD-10-CM

## 2024-01-09 DIAGNOSIS — Z00.00 ROUTINE GENERAL MEDICAL EXAMINATION AT A HEALTH CARE FACILITY: Primary | ICD-10-CM

## 2024-01-09 DIAGNOSIS — Z00.00 ROUTINE GENERAL MEDICAL EXAMINATION AT A HEALTH CARE FACILITY: ICD-10-CM

## 2024-01-09 LAB
ALBUMIN SERPL-MCNC: 4.7 G/DL (ref 3.5–5.2)
ALBUMIN/GLOB SERPL: 1.9 G/DL
ALP SERPL-CCNC: 88 U/L (ref 39–117)
ALT SERPL W P-5'-P-CCNC: 25 U/L (ref 1–41)
ANION GAP SERPL CALCULATED.3IONS-SCNC: 11.8 MMOL/L (ref 5–15)
AST SERPL-CCNC: 30 U/L (ref 1–40)
BASOPHILS # BLD AUTO: 0.02 10*3/MM3 (ref 0–0.2)
BASOPHILS NFR BLD AUTO: 0.3 % (ref 0–1.5)
BILIRUB SERPL-MCNC: 0.4 MG/DL (ref 0–1.2)
BUN SERPL-MCNC: 16 MG/DL (ref 6–20)
BUN/CREAT SERPL: 16.3 (ref 7–25)
CALCIUM SPEC-SCNC: 9.9 MG/DL (ref 8.6–10.5)
CHLORIDE SERPL-SCNC: 104 MMOL/L (ref 98–107)
CHOLEST SERPL-MCNC: 122 MG/DL (ref 0–200)
CO2 SERPL-SCNC: 26.2 MMOL/L (ref 22–29)
CREAT SERPL-MCNC: 0.98 MG/DL (ref 0.76–1.27)
DEPRECATED RDW RBC AUTO: 38 FL (ref 37–54)
EGFRCR SERPLBLD CKD-EPI 2021: 109.7 ML/MIN/1.73
EOSINOPHIL # BLD AUTO: 0.11 10*3/MM3 (ref 0–0.4)
EOSINOPHIL NFR BLD AUTO: 1.8 % (ref 0.3–6.2)
ERYTHROCYTE [DISTWIDTH] IN BLOOD BY AUTOMATED COUNT: 13 % (ref 12.3–15.4)
GLOBULIN UR ELPH-MCNC: 2.5 GM/DL
GLUCOSE SERPL-MCNC: 84 MG/DL (ref 65–99)
HCT VFR BLD AUTO: 46 % (ref 37.5–51)
HDLC SERPL-MCNC: 37 MG/DL (ref 40–60)
HGB BLD-MCNC: 15.7 G/DL (ref 13–17.7)
IMM GRANULOCYTES # BLD AUTO: 0.03 10*3/MM3 (ref 0–0.05)
IMM GRANULOCYTES NFR BLD AUTO: 0.5 % (ref 0–0.5)
LDLC SERPL CALC-MCNC: 66 MG/DL (ref 0–100)
LDLC/HDLC SERPL: 1.76 {RATIO}
LYMPHOCYTES # BLD AUTO: 2 10*3/MM3 (ref 0.7–3.1)
LYMPHOCYTES NFR BLD AUTO: 33.2 % (ref 19.6–45.3)
MCH RBC QN AUTO: 27.8 PG (ref 26.6–33)
MCHC RBC AUTO-ENTMCNC: 34.1 G/DL (ref 31.5–35.7)
MCV RBC AUTO: 81.4 FL (ref 79–97)
MONOCYTES # BLD AUTO: 0.62 10*3/MM3 (ref 0.1–0.9)
MONOCYTES NFR BLD AUTO: 10.3 % (ref 5–12)
NEUTROPHILS NFR BLD AUTO: 3.25 10*3/MM3 (ref 1.7–7)
NEUTROPHILS NFR BLD AUTO: 53.9 % (ref 42.7–76)
NRBC BLD AUTO-RTO: 0 /100 WBC (ref 0–0.2)
PLATELET # BLD AUTO: 286 10*3/MM3 (ref 140–450)
PMV BLD AUTO: 10.1 FL (ref 6–12)
POTASSIUM SERPL-SCNC: 4.5 MMOL/L (ref 3.5–5.2)
PROT SERPL-MCNC: 7.2 G/DL (ref 6–8.5)
RBC # BLD AUTO: 5.65 10*6/MM3 (ref 4.14–5.8)
SODIUM SERPL-SCNC: 142 MMOL/L (ref 136–145)
TESTOST SERPL-MCNC: 349 NG/DL (ref 249–836)
TRIGL SERPL-MCNC: 100 MG/DL (ref 0–150)
TSH SERPL DL<=0.05 MIU/L-ACNC: 1.9 UIU/ML (ref 0.27–4.2)
VLDLC SERPL-MCNC: 19 MG/DL (ref 5–40)
WBC NRBC COR # BLD AUTO: 6.03 10*3/MM3 (ref 3.4–10.8)

## 2024-01-09 PROCEDURE — 80050 GENERAL HEALTH PANEL: CPT | Performed by: NURSE PRACTITIONER

## 2024-01-09 PROCEDURE — 84403 ASSAY OF TOTAL TESTOSTERONE: CPT | Performed by: NURSE PRACTITIONER

## 2024-01-09 PROCEDURE — 36415 COLL VENOUS BLD VENIPUNCTURE: CPT | Performed by: NURSE PRACTITIONER

## 2024-01-09 PROCEDURE — 99395 PREV VISIT EST AGE 18-39: CPT | Performed by: NURSE PRACTITIONER

## 2024-01-09 PROCEDURE — 82607 VITAMIN B-12: CPT | Performed by: NURSE PRACTITIONER

## 2024-01-09 PROCEDURE — 86803 HEPATITIS C AB TEST: CPT | Performed by: NURSE PRACTITIONER

## 2024-01-09 PROCEDURE — 82306 VITAMIN D 25 HYDROXY: CPT | Performed by: NURSE PRACTITIONER

## 2024-01-09 PROCEDURE — 80061 LIPID PANEL: CPT | Performed by: NURSE PRACTITIONER

## 2024-01-09 NOTE — PROGRESS NOTES
Subjective   Gabriele Miguel is a 23 y.o. male    Chief Complaint   Patient presents with    Annual Exam     Patient in clinic for annual physical , patient states he sees the urologist on Saturday , patient states testicle pain / discomfort has gotten worse      History of Present Illness     Here for annual exam    COVID - declines  Flu shot - declines  Tdap - 8/206  Hep C screen - ordered    Diet - generally healthy    Exercise - works out 3-4 times per week; weights and cardio    Social History     Tobacco Use    Smoking status: Never    Smokeless tobacco: Never   Vaping Use    Vaping Use: Every day    Start date: 6/1/2019    Substances: Nicotine, Flavoring    Devices: [employer] tank   Substance Use Topics    Alcohol use: Yes     Alcohol/week: 5.0 standard drinks of alcohol     Types: 5 Shots of liquor per week     Comment: socially mixed drink    Drug use: Yes     Types: Marijuana     Comment: Rarely, once in the 6 months         The following portions of the patient's history were reviewed and updated as appropriate: allergies, current medications, past family history, past medical history, past social history, past surgical history, and problem list.  No current outpatient medications on file.     Review of Systems   Constitutional:  Negative for appetite change, chills, fatigue, fever and unexpected weight change.   HENT:  Negative for congestion, ear pain, nosebleeds, rhinorrhea and tinnitus.    Eyes:  Negative for pain.   Respiratory:  Negative for cough, chest tightness and shortness of breath.    Cardiovascular:  Negative for chest pain, palpitations and leg swelling.   Gastrointestinal:  Negative for abdominal distention, abdominal pain, blood in stool, constipation, diarrhea, nausea and vomiting.   Endocrine: Negative for cold intolerance, heat intolerance, polydipsia, polyphagia and polyuria.   Genitourinary:  Negative for dysuria, flank pain, frequency, hematuria and urgency.   Musculoskeletal:   Negative for arthralgias, back pain, gait problem, joint swelling, myalgias and neck pain.   Skin:  Negative for color change, pallor, rash and wound.   Allergic/Immunologic: Negative for environmental allergies and food allergies.   Neurological:  Negative for dizziness, syncope, weakness, light-headedness, numbness and headaches.   Hematological:  Negative for adenopathy. Does not bruise/bleed easily.   Psychiatric/Behavioral:  Negative for behavioral problems and suicidal ideas. The patient is not nervous/anxious.        Objective   Physical Exam  Constitutional:       Appearance: He is well-developed and normal weight.   HENT:      Head: Normocephalic and atraumatic.      Right Ear: External ear normal.      Left Ear: External ear normal.      Nose: Nose normal.      Mouth/Throat:      Mouth: Mucous membranes are moist.      Pharynx: Oropharynx is clear.   Eyes:      General: Lids are normal.      Conjunctiva/sclera: Conjunctivae normal.      Pupils: Pupils are equal, round, and reactive to light.   Neck:      Vascular: No carotid bruit.   Cardiovascular:      Rate and Rhythm: Normal rate and regular rhythm.      Pulses: Normal pulses.      Heart sounds: Normal heart sounds. No murmur heard.  Pulmonary:      Effort: Pulmonary effort is normal.      Breath sounds: Normal breath sounds.   Abdominal:      General: Bowel sounds are normal.      Palpations: Abdomen is soft. There is no hepatomegaly or splenomegaly.      Hernia: No hernia is present.   Genitourinary:     Penis: Normal.       Prostate: Normal.      Rectum: Normal. Guaiac result negative.   Musculoskeletal:         General: Normal range of motion.      Cervical back: Normal range of motion and neck supple.   Lymphadenopathy:      Cervical: No cervical adenopathy.   Skin:     General: Skin is warm and dry.      Capillary Refill: Capillary refill takes less than 2 seconds.   Neurological:      Mental Status: He is alert and oriented to person, place, and  "time.      Deep Tendon Reflexes: Reflexes are normal and symmetric.   Psychiatric:         Mood and Affect: Mood normal.         Behavior: Behavior normal.         Thought Content: Thought content normal.         Judgment: Judgment normal.       Vitals:    01/09/24 1441   BP: 140/80   Pulse: 69   Temp: 97.5 °F (36.4 °C)   SpO2: 98%   Weight: 88.8 kg (195 lb 12.8 oz)   Height: 185.4 cm (72.99\")         Assessment & Plan   Diagnoses and all orders for this visit:    1. Routine general medical examination at a health care facility (Primary)  -     CBC & Differential; Future  -     Comprehensive Metabolic Panel; Future  -     Lipid Panel; Future  -     TSH; Future  -     Vitamin B12; Future  -     Vitamin D,25-Hydroxy; Future  -     Hepatitis C Antibody; Future    2. Vitamin D deficiency  -     CBC & Differential; Future  -     Comprehensive Metabolic Panel; Future  -     Lipid Panel; Future  -     TSH; Future  -     Vitamin B12; Future  -     Vitamin D,25-Hydroxy; Future  -     Hepatitis C Antibody; Future    3. Encounter for hepatitis C screening test for low risk patient  -     CBC & Differential; Future  -     Comprehensive Metabolic Panel; Future  -     Lipid Panel; Future  -     TSH; Future  -     Vitamin B12; Future  -     Vitamin D,25-Hydroxy; Future  -     Hepatitis C Antibody; Future      Will ck labs, he is not fasting  Declines vaccines  Counseling - diet and exercise  Return in about 1 year (around 1/9/2025) for Annual, collect labs today.             "

## 2024-01-10 LAB
25(OH)D3 SERPL-MCNC: 26.9 NG/ML (ref 30–100)
HCV AB SER DONR QL: NORMAL
VIT B12 BLD-MCNC: 579 PG/ML (ref 211–946)

## 2024-01-13 ENCOUNTER — HOSPITAL ENCOUNTER (OUTPATIENT)
Dept: ULTRASOUND IMAGING | Facility: HOSPITAL | Age: 24
Discharge: HOME | End: 2024-01-13
Admitting: NURSE PRACTITIONER
Payer: COMMERCIAL

## 2024-01-13 DIAGNOSIS — N50.811 PAIN IN BOTH TESTICLES: ICD-10-CM

## 2024-01-13 DIAGNOSIS — N50.812 PAIN IN BOTH TESTICLES: ICD-10-CM

## 2024-01-13 PROCEDURE — 76870 US EXAM SCROTUM: CPT

## 2024-01-15 ENCOUNTER — TELEPHONE (OUTPATIENT)
Dept: INTERNAL MEDICINE | Facility: CLINIC | Age: 24
End: 2024-01-15
Payer: COMMERCIAL

## 2024-01-15 NOTE — TELEPHONE ENCOUNTER
----- Message from MITCHEL Guardado sent at 1/15/2024 11:03 AM EST -----  Ultrasound of the scrotum was normal.

## 2024-01-15 NOTE — TELEPHONE ENCOUNTER
L/M HUB TO RELAY   ----- Message from MITCHEL Guardado sent at 1/15/2024 10:28 AM EST -----  Please let patient know that his vitamin D is slightly low.  Recommend OTC vitamin D3 2000 units daily.     Testosterone is actually improved since last visit.  It is now 349.     All other labs are within acceptable limits.

## 2024-01-15 NOTE — TELEPHONE ENCOUNTER
Name: Gabriele Miguel    Relationship: Self    Best Callback Number: [phone]    HUB PROVIDED THE RELAY MESSAGE FROM THE OFFICE    PATIENT: VOICED UNDERSTANDING AND HAS NO FURTHER QUESTIONS AT THIS TIME

## 2024-01-15 NOTE — TELEPHONE ENCOUNTER
Name: Gabriele Miguel    Relationship: Self    Best Callback Number: [phone]    HUB PROVIDED THE RELAY MESSAGE FROM THE OFFICE    PATIENT: HAS FURTHER QUESTIONS AND WOULD LIKE A CALL BACK AT THE FOLLOWING PHONE NUMBER [phone]    ADDITIONAL INFORMATION: PATIENT IS REQUESTING AN UPDATE REGARDING THE UROLOGY REFERRAL THAT [facility].    HE STATED THAT HE HAS NOT BEEN CONTACTED FOR SCHEDULING.    SHOULD HE PROCEED WITH THE UROLOGY REFERRAL [facility]?    PLEASE ADVISE

## 2024-01-15 NOTE — TELEPHONE ENCOUNTER
----- Message from MITCHEL Guardado sent at 1/15/2024 10:28 AM EST -----  Please let patient know that his vitamin D is slightly low.  Recommend OTC vitamin D3 2000 units daily.    Testosterone is actually improved since last visit.  It is now 349.    All other labs are within acceptable limits.

## 2024-02-09 ENCOUNTER — LAB (OUTPATIENT)
Dept: LAB | Facility: HOSPITAL | Age: 24
End: 2024-02-09
Payer: COMMERCIAL

## 2024-02-09 ENCOUNTER — OFFICE VISIT (OUTPATIENT)
Dept: UROLOGY | Facility: CLINIC | Age: 24
End: 2024-02-09
Payer: COMMERCIAL

## 2024-02-09 VITALS — OXYGEN SATURATION: 99 % | HEART RATE: 70 BPM | WEIGHT: 195 LBS | BODY MASS INDEX: 25.84 KG/M2 | HEIGHT: 73 IN

## 2024-02-09 DIAGNOSIS — E29.1 HYPOGONADISM MALE: ICD-10-CM

## 2024-02-09 DIAGNOSIS — E29.1 HYPOGONADISM MALE: Primary | ICD-10-CM

## 2024-02-09 DIAGNOSIS — N50.811 PAIN IN BOTH TESTICLES: ICD-10-CM

## 2024-02-09 DIAGNOSIS — N50.812 PAIN IN BOTH TESTICLES: ICD-10-CM

## 2024-02-09 LAB
ESTRADIOL SERPL HS-MCNC: 19 PG/ML
HCT VFR BLD AUTO: 46.6 % (ref 37.5–51)
HGB BLD-MCNC: 15.8 G/DL (ref 13–17.7)
PROLACTIN SERPL-MCNC: 8.02 NG/ML (ref 4.04–15.2)

## 2024-02-09 PROCEDURE — 85018 HEMOGLOBIN: CPT

## 2024-02-09 PROCEDURE — 85014 HEMATOCRIT: CPT

## 2024-02-09 PROCEDURE — 82670 ASSAY OF TOTAL ESTRADIOL: CPT

## 2024-02-09 PROCEDURE — 84403 ASSAY OF TOTAL TESTOSTERONE: CPT

## 2024-02-09 PROCEDURE — 84402 ASSAY OF FREE TESTOSTERONE: CPT

## 2024-02-09 PROCEDURE — 84146 ASSAY OF PROLACTIN: CPT

## 2024-02-09 PROCEDURE — 36415 COLL VENOUS BLD VENIPUNCTURE: CPT

## 2024-02-09 NOTE — PROGRESS NOTES
Chief Complaint  Bilateral testicular pain    Referring Provider  Annmarie Hamilton, AP*    HPI  Mr. Miguel is a 23 y.o. male with history of meatoplasty who presents with intermittent and chronic bilateral testicular pain x 1.5 months.    Started about 1.5 months ago with increased physical activity. States he greatly increased his physical activity and training, was performing cardio and lifting. Made a big change in habits and noticed this pain, particularly with abdominal exercises such as crunches. States both testicles can be affected, feels dull and aching. Not associated with LUTS or testicular swelling.    He also shares concern for ED. He noticed low libido and concern for low T, decreased erection quality about 6 months ago.        Quality:    Dull  Provocative factors:  Activity makes it worse  Palliative factors:   Rest makes it better  Radiation:   Along the lower abdomen, bilaterally  Severity:   2/10 currently and 5/10 at its worst  Previous treatments: None    No Current LUTS  No History of vasectomy  No History of kidney stones, recent trauma or injury  No Current constipation      IPSS Questionnaire (AUA-7):  Over the past month…    1)  Incomplete Emptying:       How often have you had a sensation of not emptying you had the sensation of not emptying your bladder completely after you finished urinating?  0 - Not at all   2)  Frequency:       How often have you had the urinate again less than two hours after you finished urinating?  1 - Less than 1 time in 5   3)  Intermittency:       How often have you found you stopped and started again several times when you urinated?   0 - Not at all   4) Urgency:      How often have you found it difficult to postpone urination?  0 - Not at all   5) Weak Stream:      How often have you had a weak urinary stream?  1 - Less than 1 time in 5   6) Straining:       How often have you had to push or strain to begin urination?  0 - Not at all   7) Nocturia:      How  many times did you most typically get up to urinate from the time you went to bed at night until the time you got up in the morning?  0 - None   Total Score:  2   The International Prostate Symptom Score (IPSS) is used to screen, diagnose, track symptoms of benign prostatic hyperplasia (BPH).   0-7 (Mild Symptoms) 8-19 (Moderate) 20-35 (Severe)   Quality of Life (QoL):  If you were to spend the rest of your life with your urinary condition just the way it is now, how would you feel about that? 0-Delighted   Urine Leakage (Incontinence) 0-No Leakage        Past Medical History  Past Medical History:   Diagnosis Date    ADHD (attention deficit hyperactivity disorder)     Anxiety     Asthma     Depression        Past Surgical History  Past Surgical History:   Procedure Laterality Date    URETHRAL MEATOPLASTY  1998       Medications  No current outpatient medications on file.    Allergies  Allergies   Allergen Reactions    Dexmethylphenidate Other (See Comments)     Racing heart        Social History  Social History     Socioeconomic History    Marital status: Single   Tobacco Use    Smoking status: Never    Smokeless tobacco: Never   Vaping Use    Vaping Use: Former    Start date: 6/1/2019    Substances: Nicotine, Flavoring    Devices: Refillable tank   Substance and Sexual Activity    Alcohol use: Yes     Alcohol/week: 5.0 standard drinks of alcohol     Types: 5 Shots of liquor per week     Comment: socially mixed drink    Drug use: Yes     Types: Marijuana     Comment: Rarely, once in the 6 months    Sexual activity: Yes     Partners: Female     Comment: engaged       Family History  Family History   Problem Relation Age of Onset    Diabetes Mother     Hypertension Father     Drug abuse Father     Thyroid disease Sister         hashimotos    Other Sister         valve regurgitation    Hypertension Sister     No Known Problems Maternal Grandmother     Diabetes type II Maternal Grandfather     No Known Problems  "Paternal Grandfather     ADD / ADHD Sister        Physical Exam  Visit Vitals  Pulse 70   Ht 185.4 cm (72.99\")   Wt 88.5 kg (195 lb)   SpO2 99%   BMI 25.73 kg/m²     Physical exam revealed benign  exam.    Genitourinary  Penis: circumcised penis, glans normal, no penile discharge. Urethral pit present with neomeatus approx 1cm ventral and widely patent. No rashes/lesions.    Testes: descended bilaterally, no masses, LEFT epididymis nontender to palpation,  RIGHT epididymis nontender to palpation. No varicocele palpated. No hernia appreciated.      Labs  Brief Urine Lab Results  (Last result in the past 365 days)        Color   Clarity   Blood   Leuk Est   Nitrite   Protein   CREAT   Urine HCG        12/05/23 1401 Yellow   Clear   Negative   Negative   Negative   Negative                   Lab Results   Component Value Date    GLUCOSE 84 01/09/2024    CALCIUM 9.9 01/09/2024     01/09/2024    K 4.5 01/09/2024    CO2 26.2 01/09/2024     01/09/2024    BUN 16 01/09/2024    CREATININE 0.98 01/09/2024    EGFRIFNONA 106 12/13/2021    BCR 16.3 01/09/2024    ANIONGAP 11.8 01/09/2024       Radiologic Studies  US Scrotum & Testicles    Result Date: 1/15/2024  Impression: Unremarkable testicular ultrasound. Electronically Signed: Cecil Cobb MD  1/15/2024 10:47 AM EST  Workstation ID: [id number]        Assessment  Mr. Miguel is a 23 y.o. male who presents with chronic and intermittent bilateral scrotal pain.    As above, this all started when he greatly increased the frequency of his training at the gym.  It is worse with activity, particularly abdominal exercises.  It is relieved by rest and it is not present in the morning when he wakes up.  His physical exam is benign and scrotal ultrasound obtained prior to this visit was unremarkable.  I recommend pelvic floor physical therapy evaluation.    Regarding his concerns for decreased erection quality, low libido, fatigue, I have reviewed his previous labs.  He " actually had an initial total testosterone value that was pathologic at 285.  Repeat value improved, but only to 349.  With his current symptoms and these labs, I recommend we repeat testosterone again as I suspect he may actually have underlying hypogonadism.  We briefly discussed 3 treatment pathways which include behavioral and lifestyle interventions, the addition of medication such as Clomid, as well as formulations of testosterone.    Plan  1.  Referral to pelvic floor physical therapy  2.  Repeat total and free testosterone with estradiol and prolactin  3.  Follow-up in 3 to 4 weeks to discuss lab results and treatment    Roula Boogie PA-C

## 2024-02-16 ENCOUNTER — TELEPHONE (OUTPATIENT)
Dept: UROLOGY | Facility: CLINIC | Age: 24
End: 2024-02-16
Payer: COMMERCIAL

## 2024-02-19 LAB
TESTOST FREE SERPL-MCNC: 8.4 PG/ML (ref 9.3–26.5)
TESTOST SERPL-MCNC: 397 NG/DL (ref 264–916)

## 2024-03-04 ENCOUNTER — OFFICE VISIT (OUTPATIENT)
Dept: UROLOGY | Facility: CLINIC | Age: 24
End: 2024-03-04
Payer: COMMERCIAL

## 2024-03-04 VITALS — HEIGHT: 73 IN | BODY MASS INDEX: 25.84 KG/M2 | OXYGEN SATURATION: 99 % | HEART RATE: 101 BPM | WEIGHT: 195 LBS

## 2024-03-04 DIAGNOSIS — E29.1 HYPOGONADISM MALE: Primary | ICD-10-CM

## 2024-03-04 DIAGNOSIS — N50.811 PAIN IN BOTH TESTICLES: ICD-10-CM

## 2024-03-04 DIAGNOSIS — N50.812 PAIN IN BOTH TESTICLES: ICD-10-CM

## 2024-03-04 DIAGNOSIS — R10.2 PERINEAL PAIN: ICD-10-CM

## 2024-03-04 NOTE — PROGRESS NOTES
"       Established Male Office Visit        Chief Complaint   Patient presents with    Hypogonadism        HPI  Mr. Miguel is a 24 y.o. male with history of meatoplasty who presents for follow up of testicular pain and decreased libido.     At this visit, has not yet established with PFPT. Presents to discuss lab results and next steps regarding libido/sexual function.         Past Medical History:   Diagnosis Date    ADHD (attention deficit hyperactivity disorder)     Anxiety     Asthma     Depression        Past Surgical History:   Procedure Laterality Date    URETHRAL MEATOPLASTY  1998         Current Outpatient Medications:     clomiPHENE (CLOMID) 50 MG tablet, Take 1 tablet by mouth 3 (Three) Times a Week., Disp: 30 tablet, Rfl: 3     Physical Exam  Visit Vitals  Pulse 101   Ht 185.4 cm (72.99\")   Wt 88.5 kg (195 lb)   SpO2 99%   BMI 25.73 kg/m²       Labs  Brief Urine Lab Results  (Last result in the past 365 days)        Color   Clarity   Blood   Leuk Est   Nitrite   Protein   CREAT   Urine HCG        12/05/23 1401 Yellow   Clear   Negative   Negative   Negative   Negative                   Lab Results   Component Value Date    GLUCOSE 84 01/09/2024    CALCIUM 9.9 01/09/2024     01/09/2024    K 4.5 01/09/2024    CO2 26.2 01/09/2024     01/09/2024    BUN 16 01/09/2024    CREATININE 0.98 01/09/2024    EGFRIFNONA 106 12/13/2021    BCR 16.3 01/09/2024    ANIONGAP 11.8 01/09/2024       Lab Results   Component Value Date    WBC 6.03 01/09/2024    HGB 15.8 02/09/2024    HCT 46.6 02/09/2024    MCV 81.4 01/09/2024     01/09/2024            Radiographic Studies  US Scrotum & Testicles    Result Date: 1/15/2024  Impression: Unremarkable testicular ultrasound. Electronically Signed: Cecil Cobb MD  1/15/2024 10:47 AM EST  Workstation ID: [id number]      I have reviewed the above labs and imaging.     Assessment / Plan      Assessment/Plan:   Mr. Miguel is a 24 y.o. male with history of meatoplasty who " presents for follow up of testicular pain and decreased libido.     PFPT set up with  in May. However, Juanelsa BustamanteGreg has put in her noticed for [employer] and will not be seeing patients after March. I will refer the patient to [facility] in [place].     Regarding low T, low libido. We discussed his improving Total T, a this point, almost 400. He clarifies that his changes in erection quality are driven by lack of sexual desire, loss of interest during sexual activity. We discussed a trial of clomid, primarily as the patient admits he is taking classes online immediately after work and no longer able to work out like he was before (expecting his Total T may drop again).     If his libido and desire do not improve with a trial of Clomid and improved serum testosterone values, we discussed that hypogonadism is unlikely the etiology. We also discussed treatment with medications like cialis for primary ED. However, the patient does not have a problem achieving or maintaining an erection, when his desire is strong.       Diagnoses and all orders for this visit:    1. Hypogonadism male (Primary)  -     clomiPHENE (CLOMID) 50 MG tablet; Take 1 tablet by mouth 3 (Three) Times a Week.  Dispense: 30 tablet; Refill: 3  -     Estradiol; Future  -     Testosterone, Free, Total; Future         Follow Up:   Return in about 3 months (around 6/4/2024) for Testo, Labs Prior.      Roula Boogie PA-C  [facility] [place]

## 2024-03-05 DIAGNOSIS — E29.1 HYPOGONADISM MALE: ICD-10-CM

## 2024-11-08 ENCOUNTER — LAB (OUTPATIENT)
Dept: INTERNAL MEDICINE | Facility: CLINIC | Age: 24
End: 2024-11-08
Payer: COMMERCIAL

## 2024-11-08 DIAGNOSIS — E29.1 HYPOGONADISM MALE: ICD-10-CM

## 2024-11-08 LAB — ESTRADIOL SERPL HS-MCNC: 18 PG/ML

## 2024-11-08 PROCEDURE — 84402 ASSAY OF FREE TESTOSTERONE: CPT | Performed by: PHYSICIAN ASSISTANT

## 2024-11-08 PROCEDURE — 82670 ASSAY OF TOTAL ESTRADIOL: CPT | Performed by: PHYSICIAN ASSISTANT

## 2024-11-08 PROCEDURE — 84403 ASSAY OF TOTAL TESTOSTERONE: CPT | Performed by: PHYSICIAN ASSISTANT

## 2024-11-08 PROCEDURE — 36415 COLL VENOUS BLD VENIPUNCTURE: CPT | Performed by: PHYSICIAN ASSISTANT

## 2024-11-12 ENCOUNTER — OFFICE VISIT (OUTPATIENT)
Dept: UROLOGY | Facility: CLINIC | Age: 24
End: 2024-11-12
Payer: COMMERCIAL

## 2024-11-12 VITALS — DIASTOLIC BLOOD PRESSURE: 89 MMHG | HEART RATE: 98 BPM | SYSTOLIC BLOOD PRESSURE: 127 MMHG | OXYGEN SATURATION: 99 %

## 2024-11-12 DIAGNOSIS — E29.1 HYPOGONADISM MALE: Primary | ICD-10-CM

## 2024-11-12 RX ORDER — CLOMIPHENE CITRATE 50 MG/1
25 TABLET ORAL DAILY
Qty: 30 TABLET | Refills: 1 | Status: SHIPPED | OUTPATIENT
Start: 2024-11-12

## 2024-11-12 NOTE — PROGRESS NOTES
Low Testosterone Office Visit      Patient Name: Gabriele Miguel  : 2000   MRN: [id number]     Chief Complaint: Low Testosterone.    Chief Complaint   Patient presents with    Hypogonadism male   .     Referring Provider: No ref. provider found    History of Present Illness: Mr. Miguel is a 24 y.o. male with history of low testosterone. He has previously been managed on Clomid. He last took Clomid in April and reports he felt significantly better to include improvement in fatigue, libido and erections.  He did not follow-up and has not taken Clomid since April.    He currently reports fatigue, issues sleeping, low libido, erectile dysfunction.      Testosterone 24; 534.  Subjective      Review of System: Review of Systems   Constitutional:  Positive for fatigue.   Genitourinary:         Low libido, Erectile dysfunction   All other systems reviewed and are negative.     I have reviewed the ROS documented by my clinical staff, I have updated appropriately and I agree. MITCHEL Martinez    Past Medical History:  Past Medical History:   Diagnosis Date    ADHD (attention deficit hyperactivity disorder)     Anxiety     Asthma     Depression     Erectile dysfunction 2022    Hormone disorder 24    Low testosterone       Past Surgical History:  Past Surgical History:   Procedure Laterality Date    URETHRAL MEATOPLASTY         Medications:    Current Outpatient Medications:     clomiPHENE (CLOMID) 50 MG tablet, Take 0.5 tablets by mouth Daily., Disp: 30 tablet, Rfl: 1    Allergies:  Allergies   Allergen Reactions    Dexmethylphenidate Other (See Comments)     Racing heart        Social History:  Social History     Socioeconomic History    Marital status: Single   Tobacco Use    Smoking status: Never    Smokeless tobacco: Never   Vaping Use    Vaping status: Former    Start date: 2019    Substances: Nicotine, Flavoring    Devices: Refillable tank   Substance and Sexual Activity    Alcohol  use: Yes     Alcohol/week: 5.0 standard drinks of alcohol     Comment: 1-2 beers every few months    Drug use: Not Currently     Types: Marijuana     Comment: Rarely, once in the 6 months    Sexual activity: Yes     Partners: Female     Birth control/protection: None     Comment: engaged       Family History:  Family History   Problem Relation Age of Onset    Diabetes Mother     Hypertension Father     Drug abuse Father     Thyroid disease Sister         hashimotos    Hypertension Sister     No Known Problems Maternal Grandmother     Diabetes type II Maternal Grandfather     No Known Problems Paternal Grandfather     ADD / ADHD Sister        IPSS Questionnaire (AUA-7):  Over the past month…    1)  Incomplete Emptying:       How often have you had a sensation of not emptying you had the sensation of not emptying your bladder completely after you finished urinating?  0 - Not at all   2)  Frequency:       How often have you had the urinate again less than two hours after you finished urinating?  0 - Not at all   3)  Intermittency:       How often have you found you stopped and started again several times when you urinated?   0 - Not at all   4) Urgency:      How often have you found it difficult to postpone urination?  2 - Less than half the time   5) Weak Stream:      How often have you had a weak urinary stream?  0 - Not at all   6) Straining:       How often have you had to push or strain to begin urination?  0 - Not at all   7) Nocturia:      How many times did you most typically get up to urinate from the time you went to bed at night until the time you got up in the morning?  0 - None   Total Score:  2   The International Prostate Symptom Score (IPSS) is used to screen, diagnose, track symptoms of benign prostatic hyperplasia (BPH).   0-7 (Mild Symptoms) 8-19 (Moderate) 20-35 (Severe)   Quality of Life (QoL):  If you were to spend the rest of your life with your urinary condition just the way it is now, how would  you feel about that? 0-Delighted   Urine Leakage (Incontinence) 0-No Leakage       Objective     Physical Exam:   Vital Signs:   Vitals:    11/12/24 1049   BP: 127/89   Pulse: 98   SpO2: 99%     There is no height or weight on file to calculate BMI.     Physical Exam  Vitals and nursing note reviewed.   Constitutional:       Appearance: Normal appearance.   HENT:      Head: Normocephalic and atraumatic.      Nose: Nose normal.      Mouth/Throat:      Mouth: Mucous membranes are moist.   Eyes:      Pupils: Pupils are equal, round, and reactive to light.   Pulmonary:      Effort: Pulmonary effort is normal.   Abdominal:      General: Abdomen is flat.      Palpations: Abdomen is soft.   Musculoskeletal:         General: Normal range of motion.      Cervical back: Normal range of motion.   Skin:     General: Skin is warm and dry.      Capillary Refill: Capillary refill takes less than 2 seconds.   Neurological:      General: No focal deficit present.      Mental Status: He is alert.   Psychiatric:         Mood and Affect: Mood normal.       Labs:   Lab Results   Component Value Date    TESTOSTERONE 349.00 01/09/2024       Lab Results   Component Value Date    PSA 1.590 12/05/2023       Lab Results   Component Value Date    WBC 6.03 01/09/2024    HGB 15.8 02/09/2024    HCT 46.6 02/09/2024    MCV 81.4 01/09/2024     01/09/2024       Images:   No Images in the past 120 days found..    Measures:   Tobacco:   Gabriele Miguel  reports that he has never smoked. He has never used smokeless tobacco.            Urine Incontinence: Patient reports that he is not currently experiencing any symptoms of urinary incontinence.    Assessment / Plan      Assessment/Plan:   Mr. Miguel is a 24 y.o. male who presented today with low testosterone. Given his recurrent Low T symptoms, he will restart Clomid 25mg once daily. He will follow up in 3 months with labs prior.     Diagnoses and all orders for this visit:    1. Hypogonadism  male (Primary)  -     clomiPHENE (CLOMID) 50 MG tablet; Take 0.5 tablets by mouth Daily.  Dispense: 30 tablet; Refill: 1  -     Testosterone; Future  -     CBC (No Diff); Future         Patient Education:   We discussed today the role testosterone plays for a male patient. I have indicated that low testosterone can be associated with metabolic syndrome, erectile dysfunction, coronary artery disease, diabetes, depression, osteoporosis, fatigue, low sex drive, poor sleep, high cholesterol, increased abdominal fat, muscle loss, irritability, hot flashes, and inability to concentrate.     I explained the risks, benefits, and alternatives to testosterone therapies. I informed him of the potential SEs of chest and leg swelling, increased acne, change in mood, polycythemia, and worsening of undiagnosed prostate cancer.     Follow Up:   Return in about 3 months (around 2/12/2025) for Labs prior .    I spent approximately 15 minutes providing clinical care for this patient; including review of patient's chart and provider documentation, face to face time spent with patient in examination room (obtaining history, performing physical exam, discussing diagnosis and management options), placing orders, and completing patient documentation.     MITCHEL Jc  [facility]

## 2024-11-14 LAB
TESTOST FREE SERPL-MCNC: 10.2 PG/ML (ref 9.3–26.5)
TESTOST SERPL-MCNC: 534 NG/DL (ref 264–916)

## 2025-02-07 ENCOUNTER — TELEPHONE (OUTPATIENT)
Dept: UROLOGY | Facility: CLINIC | Age: 25
End: 2025-02-07
Payer: COMMERCIAL

## 2025-02-07 NOTE — TELEPHONE ENCOUNTER
Called and informed pt of labs that need to be completed before his appt on 02/14 with MITCHEL Kilpatrick. Pt voiced understanding.

## 2025-02-11 ENCOUNTER — LAB (OUTPATIENT)
Dept: INTERNAL MEDICINE | Facility: CLINIC | Age: 25
End: 2025-02-11
Payer: COMMERCIAL

## 2025-02-11 DIAGNOSIS — E29.1 HYPOGONADISM MALE: ICD-10-CM

## 2025-02-11 LAB
DEPRECATED RDW RBC AUTO: 38.6 FL (ref 37–54)
ERYTHROCYTE [DISTWIDTH] IN BLOOD BY AUTOMATED COUNT: 12.8 % (ref 12.3–15.4)
HCT VFR BLD AUTO: 49.3 % (ref 37.5–51)
HGB BLD-MCNC: 16.7 G/DL (ref 13–17.7)
MCH RBC QN AUTO: 28.2 PG (ref 26.6–33)
MCHC RBC AUTO-ENTMCNC: 33.9 G/DL (ref 31.5–35.7)
MCV RBC AUTO: 83.3 FL (ref 79–97)
PLATELET # BLD AUTO: 248 10*3/MM3 (ref 140–450)
PMV BLD AUTO: 10.7 FL (ref 6–12)
RBC # BLD AUTO: 5.92 10*6/MM3 (ref 4.14–5.8)
TESTOST SERPL-MCNC: 1082 NG/DL (ref 249–836)
WBC NRBC COR # BLD AUTO: 6.13 10*3/MM3 (ref 3.4–10.8)

## 2025-02-11 PROCEDURE — 84403 ASSAY OF TOTAL TESTOSTERONE: CPT | Performed by: NURSE PRACTITIONER

## 2025-02-11 PROCEDURE — 85027 COMPLETE CBC AUTOMATED: CPT | Performed by: NURSE PRACTITIONER

## 2025-02-11 PROCEDURE — 36415 COLL VENOUS BLD VENIPUNCTURE: CPT | Performed by: NURSE PRACTITIONER

## 2025-02-14 ENCOUNTER — OFFICE VISIT (OUTPATIENT)
Dept: UROLOGY | Facility: CLINIC | Age: 25
End: 2025-02-14
Payer: COMMERCIAL

## 2025-02-14 VITALS — OXYGEN SATURATION: 98 % | SYSTOLIC BLOOD PRESSURE: 135 MMHG | DIASTOLIC BLOOD PRESSURE: 91 MMHG | HEART RATE: 79 BPM

## 2025-02-14 DIAGNOSIS — E29.1 HYPOGONADISM MALE: Primary | ICD-10-CM

## 2025-02-14 RX ORDER — CLOMIPHENE CITRATE 50 MG/1
TABLET ORAL
Qty: 30 TABLET | Refills: 2 | Status: SHIPPED | OUTPATIENT
Start: 2025-02-14

## 2025-02-14 NOTE — PROGRESS NOTES
LUTS Male Office Visit      Patient Name: Gabriele Miguel  : 2000   MRN: [id number]     Chief Complaint:  Lower Urinary Tract Symptoms.   Chief Complaint   Patient presents with    Low Testosterone        Referring Provider: No ref. provider found    History of Present Illness: Mr. Miguel is a 25 y.o. male with history of low testosterone. He has been managed on Clomid. He reports he feels significantly better to include improvement in fatigue, libido and erections.      Patient does report insomnia that he feels has increased with Clomid. He has trialed nightly melatonin.  Reports vivid nightmares while trialing melatonin and has recently discontinued. Patient is scheduled with PCP for upcoming appointment and will discuss further evaluation of insomnia.    Testosterone: 1,082.00  Hematocrit; 49.3    Subjective      Review of System:   Review of Systems   All other systems reviewed and are negative.     I have reviewed the ROS documented by my clinical staff, I have updated appropriately and I agree. MITCHEL Kilpatrick    Past Medical History:  Past Medical History:   Diagnosis Date    ADHD (attention deficit hyperactivity disorder)     Anxiety     Asthma     Depression     Erectile dysfunction 2022    Hormone disorder 24    Low testosterone       Past Surgical History:  Past Surgical History:   Procedure Laterality Date    URETHRAL MEATOPLASTY         Medications:    Current Outpatient Medications:     clomiPHENE (CLOMID) 50 MG tablet, Take 0.5 tablet by mouth daily, Disp: 30 tablet, Rfl: 2    Allergies:  Allergies   Allergen Reactions    Dexmethylphenidate Other (See Comments)     Racing heart        Social History:  Social History     Socioeconomic History    Marital status: Single   Tobacco Use    Smoking status: Never    Smokeless tobacco: Never   Vaping Use    Vaping status: Former    Start date: 2019    Substances: Nicotine, Flavoring    Devices: RefXIPWIREble tank   Substance and  Sexual Activity    Alcohol use: Yes     Alcohol/week: 5.0 standard drinks of alcohol     Comment: 1-2 beers every few months    Drug use: Not Currently     Types: Marijuana     Comment: Rarely, once in the 6 months    Sexual activity: Yes     Partners: Female     Birth control/protection: None     Comment: engaged       Family History:  Family History   Problem Relation Age of Onset    Diabetes Mother     Hypertension Father     Drug abuse Father     Thyroid disease Sister         hashimotos    Hypertension Sister     No Known Problems Maternal Grandmother     Diabetes type II Maternal Grandfather     No Known Problems Paternal Grandfather     ADD / ADHD Sister          Objective     Physical Exam:   Vital Signs:   Vitals:    02/14/25 0905   BP: 135/91   Pulse: 79   SpO2: 98%     There is no height or weight on file to calculate BMI.     Physical Exam  Vitals and nursing note reviewed.   Constitutional:       Appearance: Normal appearance.   Pulmonary:      Effort: Pulmonary effort is normal.   Neurological:      Mental Status: He is alert and oriented to person, place, and time.   Psychiatric:         Mood and Affect: Mood normal.         Behavior: Behavior normal.         Labs:   Lab Results   Component Value Date    PSA 1.590 12/05/2023       Brief Urine Lab Results       None                 Lab Results   Component Value Date    GLUCOSE 84 01/09/2024    CALCIUM 9.9 01/09/2024     01/09/2024    K 4.5 01/09/2024    CO2 26.2 01/09/2024     01/09/2024    BUN 16 01/09/2024    CREATININE 0.98 01/09/2024    EGFRIFNONA 106 12/13/2021    BCR 16.3 01/09/2024    ANIONGAP 11.8 01/09/2024       Lab Results   Component Value Date    WBC 6.13 02/11/2025    HGB 16.7 02/11/2025    HCT 49.3 02/11/2025    MCV 83.3 02/11/2025     02/11/2025       Images:   No Images in the past 120 days found..    Measures:   Tobacco:   Gabriele Miguel  reports that he has never smoked. He has never used smokeless tobacco.         Urine Incontinence: Patient reports that he is not currently experiencing any symptoms of urinary incontinence.     Assessment / Plan      Assessment:  Mr. Miguel is a 25 y.o. male who presented today with low testosterone. Given his significant symptom improvement, patient will continue Clomid 25mg once daily. He will follow up in 3 months with labs prior.     Diagnoses and all orders for this visit:    1. Hypogonadism male (Primary)  -     clomiPHENE (CLOMID) 50 MG tablet; Take 0.5 tablet by mouth daily  Dispense: 30 tablet; Refill: 2  -     Testosterone; Future  -     CBC & Differential; Future        Follow Up:   No follow-ups on file.    I spent approximately 20 minutes providing clinical care for this patient; including review of patient's chart and provider documentation, face to face time spent with patient in examination room (obtaining history, performing physical exam, discussing diagnosis and management options), placing orders, and completing patient documentation.     MITCHEL Kilpatrick  [facility] [place]

## 2025-07-07 ENCOUNTER — TELEPHONE (OUTPATIENT)
Dept: UROLOGY | Facility: CLINIC | Age: 25
End: 2025-07-07
Payer: COMMERCIAL

## 2025-07-07 NOTE — TELEPHONE ENCOUNTER
----- Message from Lashay KNAPP sent at 7/7/2025  8:16 AM EDT -----  Can you all please get patient scheduled with Labs prior? Thank you!!